# Patient Record
Sex: FEMALE | Race: BLACK OR AFRICAN AMERICAN | Employment: OTHER | URBAN - METROPOLITAN AREA
[De-identification: names, ages, dates, MRNs, and addresses within clinical notes are randomized per-mention and may not be internally consistent; named-entity substitution may affect disease eponyms.]

---

## 2017-07-31 ENCOUNTER — HOSPITAL ENCOUNTER (OUTPATIENT)
Dept: ULTRASOUND IMAGING | Age: 62
Discharge: HOME | End: 2017-07-31
Attending: INTERNAL MEDICINE
Payer: COMMERCIAL

## 2017-07-31 DIAGNOSIS — K21.9 GERD (GASTROESOPHAGEAL REFLUX DISEASE): ICD-10-CM

## 2017-07-31 DIAGNOSIS — R10.11 CHRONIC RUQ PAIN: ICD-10-CM

## 2017-07-31 DIAGNOSIS — R13.10 DYSPHAGIA: ICD-10-CM

## 2017-07-31 DIAGNOSIS — G89.29 CHRONIC RUQ PAIN: ICD-10-CM

## 2017-07-31 DIAGNOSIS — K90.41 GLUTEN INTOLERANCE: ICD-10-CM

## 2017-07-31 PROCEDURE — 76705 ECHO EXAM OF ABDOMEN: CPT

## 2018-11-26 ENCOUNTER — OFFICE VISIT (OUTPATIENT)
Dept: FAMILY MEDICINE CLINIC | Age: 63
End: 2018-11-26

## 2018-11-26 VITALS
TEMPERATURE: 97.9 F | OXYGEN SATURATION: 98 % | WEIGHT: 242.8 LBS | DIASTOLIC BLOOD PRESSURE: 73 MMHG | RESPIRATION RATE: 18 BRPM | HEIGHT: 68 IN | HEART RATE: 85 BPM | SYSTOLIC BLOOD PRESSURE: 144 MMHG | BODY MASS INDEX: 36.8 KG/M2

## 2018-11-26 DIAGNOSIS — E66.9 OBESITY (BMI 35.0-39.9 WITHOUT COMORBIDITY): ICD-10-CM

## 2018-11-26 DIAGNOSIS — E16.1 HYPERINSULINEMIA: ICD-10-CM

## 2018-11-26 DIAGNOSIS — Z13.6 SCREENING FOR ISCHEMIC HEART DISEASE: ICD-10-CM

## 2018-11-26 DIAGNOSIS — E78.2 MIXED HYPERLIPIDEMIA: Primary | ICD-10-CM

## 2018-11-26 PROBLEM — E66.01 SEVERE OBESITY (HCC): Status: ACTIVE | Noted: 2018-11-26

## 2018-11-26 NOTE — PROGRESS NOTES
1. Have you been to the ER, urgent care clinic since your last visit? Hospitalized since your last visit? No 
 
2. Have you seen or consulted any other health care providers outside of the [address] [place] [address] since your last visit? Include any pap smears or colon screening. [facility] Chief Complaint Patient presents with  Weight Management Body Weight: 242.8 Body Fat%: 44.1 Muscle Mass Weight: 36.9 Body Water Weight: 42.4 Basal Metabolic Rate: 9879 BMI: 36.92

## 2018-11-26 NOTE — PATIENT INSTRUCTIONS
If there is any GI bloating/gas try lactase enzyme. [facility] Dairy relief generic capsules are a good option

## 2018-11-26 NOTE — PROGRESS NOTES
[facility] Progress Note: Initial Physician Visit Shannan Bassett is a 59 y.o. female who is here for medical screening for entering the [facility] Weight Loss Program.  
 
CC:  Obesity Weight History I personally reviewed the Medical Screening Harvinder Laity completed by patient and scanned into media section of chart. It includes duration of their obesity, maximum weight, goal weight and weight gain time line (timing), all of which give the context of their obesity AND a Family History of their obesity. Is their Weight Loss Goal entered in to Comments? Weight loss History I personally reviewed the Medical Screening Harvinder Laity completed by the patient and scanned into media section of chart. It includes number of weight loss attempts, the weight loss program that patients was most successful using, and if they have any hx of anorectic medication use, including OTC supplements. This captures modifying factors. Significant Medical History Past Medical History:  
Diagnosis Date  Gluten intolerance  History of IBS  Hypertension  Hypothyroidism  Protein deficiency ([facility] [address]  Valvular heart disease I personally reviewed the Medical Screening Harvinder Laity completed by the patient and scanned into media section of chart. This allows me to assess associated symptoms that are significant in the assessment of the patient's obesity and the patient's Past Medical History. Outpatient Medications Marked as Taking for the 11/26/18 encounter (Office Visit) with Elaine Halsted, MD  
Medication Sig Dispense Refill  OTHER,NON-FORMULARY, Pancreatic Enzymes 500mg 1 capsule with meals  thyroid, Pork, (ARMOUR THYROID) 60 mg tablet Take 60 mg by mouth daily.  MULTIVITAMIN (MULTIPLE VITAMINS PO) Take  by mouth. occasional    
 CHOLECALCIFEROL, VITAMIN D3, (VITAMIN D3 PO) Take 5,000 Int'l Units by mouth daily. SLEEP: avg 4 hours on cpap Significant Psychosocial History Has a doctor every diagnosed with Binge Eating Disorder, Bulemia or Anorexia? : no  
 
Compliance Upcoming Travel? no 
 
Social History Social History Tobacco Use  Smoking status: Never Smoker  Smokeless tobacco: Never Used Substance Use Topics  Alcohol use: Yes Alcohol/week: 0.6 oz Types: 1 Cans of beer per week Comment: occassional 1-2 per year Exercise I personally reviewed the Medical Screening [place] completed by the patient and scanned into media section of chart. Review of Systems See HPI Objective Visit Vitals /73 Pulse 85 Temp 97.9 °F (36.6 °C) (Oral) Resp 18 Ht 5' 8\" (1.727 m) Wt 242 lb 12.8 oz (110.1 kg) SpO2 98% BMI 36.92 kg/m² Weight Metrics 11/26/2018 11/26/2018 8/17/2016 7/27/2012 Weight - 242 lb 12.8 oz 221 lb 9.6 oz 230 lb Neck Circ (inches) 14 - - - Waist Measure Inches 44 - - -  
BMI - 36.92 kg/m2 33.69 kg/m2 34.98 kg/m2 Labs: See HDL labs scanned into Media section Physical Exam 
 
Vital Signs Reviewed Weight Management Metrics Reviewed Appearance: well HEENT:  Scleral icterus?  no 
Neck:  Thyromegaly or nodules? no 
Mouth: Large tongue no Heart:  RRR Lungs:  clear Abdomen:   
 Hepatomegaly? no 
 Striae present? no 
Skin:  
 Acne?  no 
 Hirsutism? no 
 Skin tags? no 
 Acanthosis Nigricans?  no 
Ext:  Edema? no 
 
 
Assessment & Plan Encounter Diagnoses Name Primary?  Mixed hyperlipidemia Yes  Screening for ischemic heart disease  Obesity (BMI 35.0-39.9 without comorbidity)  Hyperinsulinemia 1. HDL labs reviewed w/ patient 2. EKG reviewed w/ patient: 
 Personally reviewed by me, NSR, No abnormalities, QTc = 408 sec (Upper limit is 440 for males & 460 for females) 3.  Medication changes include:  
 
Based on his history, labs and EKG, Valentin Scott is  a good candidate for the [facility] Program  
 
25 min of the 45 minutes face to face time with Daquan Carlson consisted of counseling & coordinating and/or discussing treatment plans in reference to her obesity The primary encounter diagnosis was Mixed hyperlipidemia. Diagnoses of Screening for ischemic heart disease, Obesity (BMI 35.0-39.9 without comorbidity), and Hyperinsulinemia were also pertinent to this visit.

## 2018-12-04 ENCOUNTER — CLINICAL SUPPORT (OUTPATIENT)
Dept: BARIATRICS/WEIGHT MGMT | Age: 63
End: 2018-12-04

## 2018-12-04 DIAGNOSIS — E66.01 SEVERE OBESITY (HCC): Primary | ICD-10-CM

## 2018-12-05 VITALS
BODY MASS INDEX: 35.54 KG/M2 | HEART RATE: 108 BPM | DIASTOLIC BLOOD PRESSURE: 83 MMHG | SYSTOLIC BLOOD PRESSURE: 153 MMHG | WEIGHT: 234.5 LBS | HEIGHT: 68 IN

## 2018-12-05 NOTE — PROGRESS NOTES
Progress Note: Weekly Education Class in the [facility] Weight Loss Program   Is there anything that you or the patient needs to let Dr Jose Rothman know about? no  Over the past week, have you experienced any side-effects? Yes, weakness, fatigue. Valentin Scott is a 59 y.o. female who is enrolled in [facility] Weight Loss Program    Visit Vitals  /83   Pulse (!) 108   Ht 5' 8\" (1.727 m)   Wt 234 lb 8 oz (106.4 kg)   BMI 35.66 kg/m²     Weight Metrics 12/5/2018 12/4/2018 11/26/2018 11/26/2018 8/17/2016 7/27/2012   Weight - 234 lb 8 oz - 242 lb 12.8 oz 221 lb 9.6 oz 230 lb   Neck Circ (inches) - - 14 - - -   Waist Measure Inches 43 - 44 - - -   BMI - 35.66 kg/m2 - 36.92 kg/m2 33.69 kg/m2 34.98 kg/m2         Have you received any other medical care this week? no  If yes, where and for what? Have you had any change in your medications since your last visit? no  If yes what? Did you have any problems adhering to the program last week? no  If yes, please explain:       Eating Habits Over Last Week:  Did you take in 64 oz of non-caloric fluids? yes     Did you consume your 4 meal replacements each day?  yes       Physical Activity Over the Past Week:    Aerobic exercise: 0 min  Resistance exercise: 0 workouts / week

## 2018-12-14 ENCOUNTER — CLINICAL SUPPORT (OUTPATIENT)
Dept: BARIATRICS/WEIGHT MGMT | Age: 63
End: 2018-12-14

## 2018-12-14 VITALS
HEART RATE: 89 BPM | WEIGHT: 230.2 LBS | BODY MASS INDEX: 34.89 KG/M2 | DIASTOLIC BLOOD PRESSURE: 76 MMHG | SYSTOLIC BLOOD PRESSURE: 144 MMHG | HEIGHT: 68 IN

## 2018-12-14 DIAGNOSIS — E16.1 HYPERINSULINEMIA: ICD-10-CM

## 2018-12-14 DIAGNOSIS — E66.01 SEVERE OBESITY (HCC): Primary | ICD-10-CM

## 2018-12-14 DIAGNOSIS — E78.2 MIXED HYPERLIPIDEMIA: ICD-10-CM

## 2018-12-14 DIAGNOSIS — E88.81 INSULIN RESISTANCE: ICD-10-CM

## 2018-12-14 NOTE — PROGRESS NOTES
Progress Note: Weekly Education Class in the [facility] Weight Loss Program   Is there anything that you or the patient needs to let Dr Jamie Vieira know about? no  Over the past week, have you experienced any side-effects? no    Gurmeet Harris is a 59 y.o. female who is enrolled in [facility] Weight Loss Program    Visit Vitals  /76   Pulse 89   Ht 5' 8\" (1.727 m)   Wt 230 lb 3.2 oz (104.4 kg)   BMI 35.00 kg/m²     Weight Metrics 12/14/2018 12/5/2018 12/4/2018 11/26/2018 11/26/2018 8/17/2016 7/27/2012   Weight 230 lb 3.2 oz - 234 lb 8 oz - 242 lb 12.8 oz 221 lb 9.6 oz 230 lb   Neck Circ (inches) - - - 14 - - -   Waist Measure Inches 42.5 43 - 44 - - -   BMI 35 kg/m2 - 35.66 kg/m2 - 36.92 kg/m2 33.69 kg/m2 34.98 kg/m2         Have you received any other medical care this week? no  If yes, where and for what? Have you had any change in your medications since your last visit? no  If yes what? Did you have any problems adhering to the program last week? yes  If yes, please explain:  Low carb foods       Eating Habits Over Last Week:  Did you take in 64 oz of non-caloric fluids?  yes     Did you consume your 4 meal replacements each day? yes  2-4 daily      Physical Activity Over the Past Week:    Aerobic exercise: 30 min  Resistance exercise: 0 workouts / week

## 2018-12-18 ENCOUNTER — CLINICAL SUPPORT (OUTPATIENT)
Dept: BARIATRICS/WEIGHT MGMT | Age: 63
End: 2018-12-18

## 2018-12-18 DIAGNOSIS — E66.01 SEVERE OBESITY (HCC): Primary | ICD-10-CM

## 2018-12-21 ENCOUNTER — CLINICAL SUPPORT (OUTPATIENT)
Dept: BARIATRICS/WEIGHT MGMT | Age: 63
End: 2018-12-21

## 2018-12-21 VITALS
WEIGHT: 226.5 LBS | DIASTOLIC BLOOD PRESSURE: 89 MMHG | HEIGHT: 68 IN | BODY MASS INDEX: 34.33 KG/M2 | SYSTOLIC BLOOD PRESSURE: 139 MMHG | HEART RATE: 85 BPM

## 2018-12-21 DIAGNOSIS — E16.1 HYPERINSULINEMIA: ICD-10-CM

## 2018-12-21 DIAGNOSIS — E88.81 INSULIN RESISTANCE: ICD-10-CM

## 2018-12-21 DIAGNOSIS — E66.01 SEVERE OBESITY (HCC): Primary | ICD-10-CM

## 2018-12-21 NOTE — PROGRESS NOTES
Progress Note: Weekly Education Class in the [facility] Weight Loss Program   Is there anything that you or the patient needs to let Dr Tea Meneses know about? no  Over the past week, have you experienced any side-effects? no    Rachana Malagon is a 59 y.o. female who is enrolled in [facility] Loss Program    Visit Vitals  /89   Pulse 85   Ht 5' 8\" (1.727 m)   Wt 226 lb 8 oz (102.7 kg)   BMI 34.44 kg/m²     Weight Metrics 12/21/2018 12/14/2018 12/5/2018 12/4/2018 11/26/2018 11/26/2018 8/17/2016   Weight 226 lb 8 oz 230 lb 3.2 oz - 234 lb 8 oz - 242 lb 12.8 oz 221 lb 9.6 oz   Neck Circ (inches) - - - - 14 - -   Waist Measure Inches 43 42.5 43 - 44 - -   BMI 34.44 kg/m2 35 kg/m2 - 35.66 kg/m2 - 36.92 kg/m2 33.69 kg/m2         Have you received any other medical care this week? no  If yes, where and for what? Have you had any change in your medications since your last visit? no  If yes what? Did you have any problems adhering to the program last week? no  If yes, please explain:       Eating Habits Over Last Week:  Did you take in 64 oz of non-caloric fluids? yes     Did you consume your 4 meal replacements each day?  yes       Physical Activity Over the Past Week:    Aerobic exercise: 60 min  Resistance exercise: 0 workouts / week

## 2018-12-24 ENCOUNTER — OFFICE VISIT (OUTPATIENT)
Dept: FAMILY MEDICINE CLINIC | Age: 63
End: 2018-12-24

## 2018-12-24 VITALS
WEIGHT: 231.4 LBS | TEMPERATURE: 98.3 F | SYSTOLIC BLOOD PRESSURE: 149 MMHG | DIASTOLIC BLOOD PRESSURE: 90 MMHG | OXYGEN SATURATION: 6 % | RESPIRATION RATE: 20 BRPM | HEIGHT: 68 IN | HEART RATE: 86 BPM | BODY MASS INDEX: 35.07 KG/M2

## 2018-12-24 DIAGNOSIS — E78.2 MIXED HYPERLIPIDEMIA: Primary | ICD-10-CM

## 2018-12-24 DIAGNOSIS — E88.81 INSULIN RESISTANCE: ICD-10-CM

## 2018-12-24 DIAGNOSIS — E16.1 HYPERINSULINEMIA: ICD-10-CM

## 2018-12-24 DIAGNOSIS — E66.9 OBESITY (BMI 35.0-39.9 WITHOUT COMORBIDITY): ICD-10-CM

## 2018-12-24 NOTE — PROGRESS NOTES
1. Have you been to the ER, urgent care clinic since your last visit? Hospitalized since your last visit? No    2. Have you seen or consulted any other health care providers outside of the [address] [place] [address] since your last visit? Include any pap smears or colon screening.  NO    Chief Complaint   Patient presents with    Weight Management     Body Weight: 231.4  Body Fat%: 43.9  Muscle Mass Weight: 35.1  Body Water Weight: 95.8  Basal Metabolic Rate: 3443  BMI: 35.18

## 2018-12-24 NOTE — PROGRESS NOTES
[facility] Progress Note:   F/up Physician Visit    CC: Weight Management      Daron Garcia is a 59 y.o. female who is here for her  f/up physician visit for the VLCD / LCD Program.    She had fried wings  And some tuna salad on Friday   Also wearing heavier clothes today  Her weight is up 5 lbs on the scale      Weight Metrics 12/24/2018 12/24/2018 12/21/2018 12/14/2018 12/5/2018 12/4/2018 11/26/2018   Weight - 231 lb 6.4 oz 226 lb 8 oz 230 lb 3.2 oz - 234 lb 8 oz -   Neck Circ (inches) 13.5 - - - - - 14   Waist Measure Inches 40 - 43 42.5 43 - 44   BMI - 35.18 kg/m2 34.44 kg/m2 35 kg/m2 - 35.66 kg/m2 -         Outpatient Medications Marked as Taking for the 12/24/18 encounter (Office Visit) with Salvador Menendez MD   Medication Sig Dispense Refill    OTHER,NON-FORMULARY, Pancreatic Enzymes 500mg 1 capsule with meals      thyroid, Pork, (ARMOUR THYROID) 60 mg tablet Take 60 mg by mouth daily.  MULTIVITAMIN (MULTIPLE VITAMINS PO) Take  by mouth. occasional           Participation   Did you attend clinic and class last week? no    Review of Systems  Since your last visit, have you experienced any complications? no  If yes, please list:       Are you taking an appetite suppressant? no  If so, is there any Chest Pain, Palpitations or Dizziness? BP Readings from Last 3 Encounters:   12/24/18 149/90   12/21/18 139/89   12/14/18 144/76     Night    SLEEP:3-4 hours broken up last     Have you received any other medical care this week? no  If yes, where and for what? Have you discontinued or changed any medicine or dose of your medicine since your last visit with Dr Julian Ashton? no  If yes, where and for what? Diet  How many ounces of calorie-free liquids did you consume each day? 64 oz    How many meal replacements did you take each day?  3    Did you have any problems adhering to the program?  no If yes, please explain:      Exercise  Aerobic exercise: 0 min  Resistance exercise: 0 workouts / week  Any discomfort?  no     If yes, where? Objective  Visit Vitals  /90   Pulse 86   Temp 98.3 °F (36.8 °C) (Oral)   Resp 20   Ht 5' 8\" (1.727 m)   Wt 231 lb 6.4 oz (105 kg)   SpO2 (!) 6%   BMI 35.18 kg/m²     No LMP recorded. Patient has had a hysterectomy. Physical Exam  Appearance: well,   Mental:A&O x 3, NAD  H:NC/AT,  EENT:   EOMI, PERRL, No scleral icterus  Neck: no bruit or JVD  Lung: clear, No W/R  ABD: soft, active, nontender  Ext:  no Edema  Neuro: nonfocal  Assessment / Plan    Encounter Diagnoses   Name Primary?  Mixed hyperlipidemia Yes    Obesity (BMI 35.0-39.9 without comorbidity)     Hyperinsulinemia     Insulin resistance      Diagnoses and all orders for this visit:    1. Mixed hyperlipidemia    2. Obesity (BMI 35.0-39.9 without comorbidity)    3. Hyperinsulinemia    4. Insulin resistance      1. Weight management improved   Progress was reviewed with patient    2. Labs    Latest results reviewed with patient   Lab slip given to pt for f/up HDL labs      Over 20 minutes of the 30 minutes face to face time with Abelardo Farias consisted of counseling & coordinating and/or discussing treatment plans in reference to her obesity The primary encounter diagnosis was Mixed hyperlipidemia. Diagnoses of Obesity (BMI 35.0-39.9 without comorbidity), Hyperinsulinemia, and Insulin resistance were also pertinent to this visit.

## 2018-12-28 NOTE — PROGRESS NOTES
Nurse note from patient's weekly VLCD / LCD / Maintenance class was reviewed. Pertinent medical concerns were:   none     BP Readings from Last 3 Encounters:   12/24/18 149/90   12/21/18 139/89   12/14/18 144/76       Weight Metrics 12/24/2018 12/24/2018 12/21/2018 12/14/2018 12/5/2018 12/4/2018 11/26/2018   Weight - 231 lb 6.4 oz 226 lb 8 oz 230 lb 3.2 oz - 234 lb 8 oz -   Neck Circ (inches) 13.5 - - - - - 14   Waist Measure Inches 40 - 43 42.5 43 - 44   BMI - 35.18 kg/m2 34.44 kg/m2 35 kg/m2 - 35.66 kg/m2 -       Current Outpatient Medications   Medication Sig Dispense Refill    lisinopril (PRINIVIL, ZESTRIL) 10 mg tablet Take  by mouth daily.  co-enzyme Q-10 (CO Q-10) 100 mg capsule Take 100 mg by mouth daily.  aspirin delayed-release 81 mg tablet Take 81 mg by mouth two (2) times a week.  OTHER,NON-FORMULARY, Pancreatic Enzymes 500mg 1 capsule with meals      TURMERIC, BULK, Take 500 mg by mouth. occasional      OTHER,NON-FORMULARY, Gallbladder formula 1 capsule 2-3 times a day with meals      LACTOBACILLUS ACIDOPHILUS (PROBIOTIC PO) Take 1 Cap by mouth two (2) times a day.  COCONUT OIL PO Take  by mouth. occasional      thyroid, Pork, (ARMOUR THYROID) 60 mg tablet Take 60 mg by mouth daily.  MULTIVITAMIN (MULTIPLE VITAMINS PO) Take  by mouth. occasional      CHOLECALCIFEROL, VITAMIN D3, (VITAMIN D3 PO) Take 5,000 Int'l Units by mouth daily.

## 2019-01-11 ENCOUNTER — CLINICAL SUPPORT (OUTPATIENT)
Dept: BARIATRICS/WEIGHT MGMT | Age: 64
End: 2019-01-11

## 2019-01-11 VITALS
HEART RATE: 102 BPM | BODY MASS INDEX: 32.99 KG/M2 | HEIGHT: 68 IN | SYSTOLIC BLOOD PRESSURE: 145 MMHG | WEIGHT: 217.7 LBS | DIASTOLIC BLOOD PRESSURE: 89 MMHG

## 2019-01-11 DIAGNOSIS — E66.01 SEVERE OBESITY (HCC): Primary | ICD-10-CM

## 2019-01-11 DIAGNOSIS — E88.81 INSULIN RESISTANCE: ICD-10-CM

## 2019-01-11 DIAGNOSIS — E78.2 MIXED HYPERLIPIDEMIA: ICD-10-CM

## 2019-01-11 NOTE — PROGRESS NOTES
Progress Note: Weekly Education Class in the [facility] Weight Loss Program   Is there anything that you or the patient needs to let Dr Fitz Camargo know about? no  Over the past week, have you experienced any side-effects? no    Vinod Arriola is a 59 y.o. female who is enrolled in [id number] [facility] Weight Loss Program    Visit Vitals  /89   Pulse (!) 102   Ht 5' 8\" (1.727 m)   Wt 217 lb 11.2 oz (98.7 kg)   BMI 33.10 kg/m²     Weight Metrics 1/11/2019 12/24/2018 12/24/2018 12/21/2018 12/14/2018 12/5/2018 12/4/2018   Weight 217 lb 11.2 oz - 231 lb 6.4 oz 226 lb 8 oz 230 lb 3.2 oz - 234 lb 8 oz   Neck Circ (inches) - 13.5 - - - - -   Waist Measure Inches 39.5 40 - 43 42.5 43 -   BMI 33.1 kg/m2 - 35.18 kg/m2 34.44 kg/m2 35 kg/m2 - 35.66 kg/m2         Have you received any other medical care this week? no  If yes, where and for what? Have you had any change in your medications since your last visit? no  If yes what? Did you have any problems adhering to the program last week? no  If yes, please explain:       Eating Habits Over Last Week:  Did you take in 64 oz of non-caloric fluids? yes     Did you consume your 4 meal replacements each day?  yes       Physical Activity Over the Past Week:    Aerobic exercise: 0 min  Resistance exercise: 0 workouts / week

## 2019-01-17 NOTE — PROGRESS NOTES
Nurse note from patient's weekly VLCD / LCD / Maintenance class was reviewed. Pertinent medical concerns were:   none     BP Readings from Last 3 Encounters:   01/11/19 145/89   12/24/18 149/90   12/21/18 139/89       Weight Metrics 1/11/2019 12/24/2018 12/24/2018 12/21/2018 12/14/2018 12/5/2018 12/4/2018   Weight 217 lb 11.2 oz - 231 lb 6.4 oz 226 lb 8 oz 230 lb 3.2 oz - 234 lb 8 oz   Neck Circ (inches) - 13.5 - - - - -   Waist Measure Inches 39.5 40 - 43 42.5 43 -   BMI 33.1 kg/m2 - 35.18 kg/m2 34.44 kg/m2 35 kg/m2 - 35.66 kg/m2       Current Outpatient Medications   Medication Sig Dispense Refill    lisinopril (PRINIVIL, ZESTRIL) 10 mg tablet Take  by mouth daily.  co-enzyme Q-10 (CO Q-10) 100 mg capsule Take 100 mg by mouth daily.  aspirin delayed-release 81 mg tablet Take 81 mg by mouth two (2) times a week.  OTHER,NON-FORMULARY, Pancreatic Enzymes 500mg 1 capsule with meals      TURMERIC, BULK, Take 500 mg by mouth. occasional      OTHER,NON-FORMULARY, Gallbladder formula 1 capsule 2-3 times a day with meals      LACTOBACILLUS ACIDOPHILUS (PROBIOTIC PO) Take 1 Cap by mouth two (2) times a day.  COCONUT OIL PO Take  by mouth. occasional      thyroid, Pork, (ARMOUR THYROID) 60 mg tablet Take 60 mg by mouth daily.  MULTIVITAMIN (MULTIPLE VITAMINS PO) Take  by mouth. occasional      CHOLECALCIFEROL, VITAMIN D3, (VITAMIN D3 PO) Take 5,000 Int'l Units by mouth daily.

## 2019-01-18 ENCOUNTER — CLINICAL SUPPORT (OUTPATIENT)
Dept: BARIATRICS/WEIGHT MGMT | Age: 64
End: 2019-01-18

## 2019-01-18 VITALS
DIASTOLIC BLOOD PRESSURE: 71 MMHG | HEART RATE: 89 BPM | BODY MASS INDEX: 33.13 KG/M2 | SYSTOLIC BLOOD PRESSURE: 153 MMHG | WEIGHT: 218.6 LBS | HEIGHT: 68 IN

## 2019-01-18 DIAGNOSIS — E78.2 MIXED HYPERLIPIDEMIA: Primary | ICD-10-CM

## 2019-01-18 DIAGNOSIS — E16.1 HYPERINSULINEMIA: ICD-10-CM

## 2019-01-18 DIAGNOSIS — E66.01 SEVERE OBESITY (HCC): ICD-10-CM

## 2019-01-18 NOTE — PROGRESS NOTES
Progress Note: Weekly Education Class in the [facility] Weight Loss Program   Is there anything that you or the patient needs to let Dr Josse Myles know about? no  Over the past week, have you experienced any side-effects? no    Apple Calloway is a 59 y.o. female who is enrolled in [facility] Weight Loss Program    Visit Vitals  /71   Pulse 89   Ht 5' 8\" (1.727 m)   Wt 218 lb 9.6 oz (99.2 kg)   BMI 33.24 kg/m²     Weight Metrics 1/18/2019 1/11/2019 12/24/2018 12/24/2018 12/21/2018 12/14/2018 12/5/2018   Weight 218 lb 9.6 oz 217 lb 11.2 oz - 231 lb 6.4 oz 226 lb 8 oz 230 lb 3.2 oz -   Neck Circ (inches) - - 13.5 - - - -   Waist Measure Inches - 39.5 40 - 43 42.5 43   BMI 33.24 kg/m2 33.1 kg/m2 - 35.18 kg/m2 34.44 kg/m2 35 kg/m2 -         Have you received any other medical care this week? no  If yes, where and for what? Have you had any change in your medications since your last visit? no  If yes what? Did you have any problems adhering to the program last week? no  If yes, please explain:       Eating Habits Over Last Week:  Did you take in 64 oz of non-caloric fluids? yes     Did you consume your 4 meal replacements each day?  yes       Physical Activity Over the Past Week:    Aerobic exercise: 0 min  Resistance exercise: 0 workouts / week

## 2019-01-21 ENCOUNTER — OFFICE VISIT (OUTPATIENT)
Dept: FAMILY MEDICINE CLINIC | Age: 64
End: 2019-01-21

## 2019-01-21 VITALS
SYSTOLIC BLOOD PRESSURE: 122 MMHG | RESPIRATION RATE: 19 BRPM | TEMPERATURE: 98.5 F | OXYGEN SATURATION: 96 % | HEART RATE: 104 BPM | WEIGHT: 215.8 LBS | DIASTOLIC BLOOD PRESSURE: 79 MMHG | HEIGHT: 68 IN | BODY MASS INDEX: 32.71 KG/M2

## 2019-01-21 DIAGNOSIS — E88.81 INSULIN RESISTANCE: ICD-10-CM

## 2019-01-21 DIAGNOSIS — E66.9 OBESITY (BMI 35.0-39.9 WITHOUT COMORBIDITY): ICD-10-CM

## 2019-01-21 DIAGNOSIS — E16.1 HYPERINSULINEMIA: ICD-10-CM

## 2019-01-21 DIAGNOSIS — E78.2 MIXED HYPERLIPIDEMIA: ICD-10-CM

## 2019-01-21 DIAGNOSIS — K59.01 SLOW TRANSIT CONSTIPATION: Primary | ICD-10-CM

## 2019-01-21 RX ORDER — DOCUSATE SODIUM 100 MG/1
100 CAPSULE, LIQUID FILLED ORAL 2 TIMES DAILY
Qty: 60 CAP | Refills: 2 | Status: SHIPPED | OUTPATIENT
Start: 2019-01-21 | End: 2019-04-21

## 2019-01-21 NOTE — PROGRESS NOTES
Nurse note from patient's weekly VLCD / LCD / Maintenance class was reviewed. Pertinent medical concerns were:   none     BP Readings from Last 3 Encounters:   01/18/19 153/71   01/11/19 145/89   12/24/18 149/90       Weight Metrics 1/18/2019 1/11/2019 12/24/2018 12/24/2018 12/21/2018 12/14/2018 12/5/2018   Weight 218 lb 9.6 oz 217 lb 11.2 oz - 231 lb 6.4 oz 226 lb 8 oz 230 lb 3.2 oz -   Neck Circ (inches) - - 13.5 - - - -   Waist Measure Inches - 39.5 40 - 43 42.5 43   BMI 33.24 kg/m2 33.1 kg/m2 - 35.18 kg/m2 34.44 kg/m2 35 kg/m2 -       Current Outpatient Medications   Medication Sig Dispense Refill    lisinopril (PRINIVIL, ZESTRIL) 10 mg tablet Take  by mouth daily.  co-enzyme Q-10 (CO Q-10) 100 mg capsule Take 100 mg by mouth daily.  aspirin delayed-release 81 mg tablet Take 81 mg by mouth two (2) times a week.  OTHER,NON-FORMULARY, Pancreatic Enzymes 500mg 1 capsule with meals      TURMERIC, BULK, Take 500 mg by mouth. occasional      OTHER,NON-FORMULARY, Gallbladder formula 1 capsule 2-3 times a day with meals      LACTOBACILLUS ACIDOPHILUS (PROBIOTIC PO) Take 1 Cap by mouth two (2) times a day.  COCONUT OIL PO Take  by mouth. occasional      thyroid, Pork, (ARMOUR THYROID) 60 mg tablet Take 60 mg by mouth daily.  MULTIVITAMIN (MULTIPLE VITAMINS PO) Take  by mouth. occasional      CHOLECALCIFEROL, VITAMIN D3, (VITAMIN D3 PO) Take 5,000 Int'l Units by mouth daily.

## 2019-01-21 NOTE — PROGRESS NOTES
[facility] Progress Note:  
F/up Physician Visit CC: Weight Management Arvella Homans is a 59 y.o. female who is here for her  f/up physician visit for the VLCD / LCD Program. 
She is c/o constipation. She is drinking water She walks 2-3 times a day She has a bm about every other day She is drinking 2 fiber drinks a day Weight Metrics 1/21/2019 1/21/2019 1/18/2019 1/11/2019 12/24/2018 12/24/2018 12/21/2018 Weight - 215 lb 12.8 oz 218 lb 9.6 oz 217 lb 11.2 oz - 231 lb 6.4 oz 226 lb 8 oz Neck Circ (inches) - - - - 13.5 - - Waist Measure Inches 40.5 - - 39.5 40 - 43 BMI - 32.81 kg/m2 33.24 kg/m2 33.1 kg/m2 - 35.18 kg/m2 34.44 kg/m2  
242 
215 
-28 lbs Outpatient Medications Marked as Taking for the 1/21/19 encounter (Office Visit) with Lisa Espinoza MD  
Medication Sig Dispense Refill  docusate sodium (COLACE) 100 mg capsule Take 1 Cap by mouth two (2) times a day for 90 days. 60 Cap 2  
 OTHER,NON-FORMULARY, Pancreatic Enzymes 500mg 1 capsule with meals  thyroid, Pork, (ARMOUR THYROID) 60 mg tablet Take 60 mg by mouth daily.  MULTIVITAMIN (MULTIPLE VITAMINS PO) Take  by mouth. occasional    
 
 
Participation Did you attend clinic and class last week? yes Review of Systems Since your last visit, have you experienced any complications? no If yes, please list:  
 
 
Are you taking an appetite suppressant? no 
If so, is there any Chest Pain, Palpitations or Dizziness? BP Readings from Last 3 Encounters:  
01/21/19 122/79  
01/18/19 153/71  
01/11/19 145/89 SLEEP: 
 
Have you received any other medical care this week? no  If yes, where and for what? Have you discontinued or changed any medicine or dose of your medicine since your last visit with Dr Yaritza Hope? no  If yes, where and for what? Diet How many ounces of calorie-free liquids did you consume each day? 64 oz How many meal replacements did you take each day? 4 
 
 Did you have any problems adhering to the program?  no If yes, please explain: 
 
 
Exercise Aerobic exercise: 20 min Resistance exercise: 2 workouts / week Any discomfort?  no If yes, where? Objective Visit Vitals /79 Pulse (!) 104 Temp 98.5 °F (36.9 °C) (Oral) Resp 19 Ht 5' 8\" (1.727 m) Wt 215 lb 12.8 oz (97.9 kg) SpO2 96% BMI 32.81 kg/m² No LMP recorded. Patient has had a hysterectomy. Physical Exam 
Appearance: well, Mental:A&O x 3, NAD H:NC/AT, 
EENT:   EOMI, PERRL, No scleral icterus Neck: no bruit or JVD Lung: clear, No W/R 
ABD: soft, active, nontender Ext:  no Edema Neuro: nonfocal 
Assessment / Plan Encounter Diagnoses Name Primary?  Slow transit constipation Yes  Mixed hyperlipidemia  Obesity (BMI 35.0-39.9 without comorbidity)  Hyperinsulinemia  Insulin resistance Diagnoses and all orders for this visit: 1. Slow transit constipation 
-     docusate sodium (COLACE) 100 mg capsule; Take 1 Cap by mouth two (2) times a day for 90 days. 2. Mixed hyperlipidemia Cont to monitor andtreat 3. Obesity (BMI 35.0-39.9 without comorbidity) Starting the vlcd today 4. Hyperinsulinemia Monitor and treat 5. Insulin resistance 1. Weight management needs improvement Progress was reviewed with patient 2. Labs Latest results reviewed with patient Lab slip given to pt for f/up HDL labs Over 20minutes of the 30 minutes face to face time with [facility] consisted of counseling & coordinating and/or discussing treatment plans in reference to her obesity The primary encounter diagnosis was Slow transit constipation. Diagnoses of Mixed hyperlipidemia, Obesity (BMI 35.0-39.9 without comorbidity), Hyperinsulinemia, and Insulin resistance were also pertinent to this visit.

## 2019-01-21 NOTE — PROGRESS NOTES
1. Have you been to the ER, urgent care clinic since your last visit? Hospitalized since your last visit? No 
 
2. Have you seen or consulted any other health care providers outside of the [address] [place] [address] since your last visit? Include any pap smears or colon screening. No  
 
Chief Complaint Patient presents with  Weight Management Body Weight: 215.8 Body Fat%: 42.0 Muscle Mass Weight: 32.7 Body Water Weight: 43.4 Basal Metabolic Rate: 2978 BMI: 32.81

## 2019-01-29 ENCOUNTER — CLINICAL SUPPORT (OUTPATIENT)
Dept: BARIATRICS/WEIGHT MGMT | Age: 64
End: 2019-01-29

## 2019-01-29 DIAGNOSIS — E78.2 MIXED HYPERLIPIDEMIA: Primary | ICD-10-CM

## 2019-01-29 DIAGNOSIS — E66.01 SEVERE OBESITY (HCC): ICD-10-CM

## 2019-02-01 ENCOUNTER — CLINICAL SUPPORT (OUTPATIENT)
Dept: BARIATRICS/WEIGHT MGMT | Age: 64
End: 2019-02-01

## 2019-02-01 VITALS
WEIGHT: 214.5 LBS | HEART RATE: 83 BPM | SYSTOLIC BLOOD PRESSURE: 138 MMHG | HEIGHT: 68 IN | DIASTOLIC BLOOD PRESSURE: 67 MMHG | BODY MASS INDEX: 32.51 KG/M2

## 2019-02-01 DIAGNOSIS — E78.2 MIXED HYPERLIPIDEMIA: Primary | ICD-10-CM

## 2019-02-01 DIAGNOSIS — E66.01 SEVERE OBESITY (HCC): ICD-10-CM

## 2019-02-01 DIAGNOSIS — E16.1 HYPERINSULINEMIA: ICD-10-CM

## 2019-02-01 NOTE — PROGRESS NOTES
Progress Note: Weekly Education Class in the [facility] Weight Loss Program   Is there anything that you or the patient needs to let Dr Chrissie Colby know about? no  Over the past week, have you experienced any side-effects? no    Stephane Trujillo is a 59 y.o. female who is enrolled in [facility]    Visit Vitals  /67   Pulse 83   Ht 5' 8\" (1.727 m)   Wt 214 lb 8 oz (97.3 kg)   BMI 32.61 kg/m²     Weight Metrics 2/1/2019 1/21/2019 1/21/2019 1/18/2019 1/11/2019 12/24/2018 12/24/2018   Weight 214 lb 8 oz - 215 lb 12.8 oz 218 lb 9.6 oz 217 lb 11.2 oz - 231 lb 6.4 oz   Neck Circ (inches) - - - - - 13.5 -   Waist Measure Inches 39.5 40.5 - - 39.5 40 -   BMI 32.61 kg/m2 - 32.81 kg/m2 33.24 kg/m2 33.1 kg/m2 - 35.18 kg/m2         Have you received any other medical care this week? no  If yes, where and for what? Have you had any change in your medications since your last visit? no  If yes what? Did you have any problems adhering to the program last week? no  If yes, please explain:       Eating Habits Over Last Week:  Did you take in 64 oz of non-caloric fluids? yes     Did you consume your 4 meal replacements each day?  Yes, 3-4 daily with peanuts and broccoli      Physical Activity Over the Past Week:    Aerobic exercise: 60 min  Resistance exercise: 0 workouts / week

## 2019-02-05 NOTE — PROGRESS NOTES
Nurse note from patient's weekly VLCD / LCD / Maintenance class was reviewed. Pertinent medical concerns were:  none     BP Readings from Last 3 Encounters:   02/01/19 138/67   01/21/19 122/79   01/18/19 153/71       Weight Metrics 2/1/2019 1/21/2019 1/21/2019 1/18/2019 1/11/2019 12/24/2018 12/24/2018   Weight 214 lb 8 oz - 215 lb 12.8 oz 218 lb 9.6 oz 217 lb 11.2 oz - 231 lb 6.4 oz   Neck Circ (inches) - - - - - 13.5 -   Waist Measure Inches 39.5 40.5 - - 39.5 40 -   BMI 32.61 kg/m2 - 32.81 kg/m2 33.24 kg/m2 33.1 kg/m2 - 35.18 kg/m2       Current Outpatient Medications   Medication Sig Dispense Refill    docusate sodium (COLACE) 100 mg capsule Take 1 Cap by mouth two (2) times a day for 90 days. 60 Cap 2    lisinopril (PRINIVIL, ZESTRIL) 10 mg tablet Take  by mouth daily.  co-enzyme Q-10 (CO Q-10) 100 mg capsule Take 100 mg by mouth daily.  aspirin delayed-release 81 mg tablet Take 81 mg by mouth two (2) times a week.  OTHER,NON-FORMULARY, Pancreatic Enzymes 500mg 1 capsule with meals      TURMERIC, BULK, Take 500 mg by mouth. occasional      OTHER,NON-FORMULARY, Gallbladder formula 1 capsule 2-3 times a day with meals      LACTOBACILLUS ACIDOPHILUS (PROBIOTIC PO) Take 1 Cap by mouth two (2) times a day.  COCONUT OIL PO Take  by mouth. occasional      thyroid, Pork, (ARMOUR THYROID) 60 mg tablet Take 60 mg by mouth daily.  MULTIVITAMIN (MULTIPLE VITAMINS PO) Take  by mouth. occasional      CHOLECALCIFEROL, VITAMIN D3, (VITAMIN D3 PO) Take 5,000 Int'l Units by mouth daily.

## 2019-02-08 ENCOUNTER — CLINICAL SUPPORT (OUTPATIENT)
Dept: BARIATRICS/WEIGHT MGMT | Age: 64
End: 2019-02-08

## 2019-02-08 VITALS
HEART RATE: 78 BPM | DIASTOLIC BLOOD PRESSURE: 65 MMHG | HEIGHT: 68 IN | SYSTOLIC BLOOD PRESSURE: 148 MMHG | BODY MASS INDEX: 32.46 KG/M2 | WEIGHT: 214.2 LBS

## 2019-02-08 DIAGNOSIS — E66.01 SEVERE OBESITY (HCC): ICD-10-CM

## 2019-02-08 DIAGNOSIS — E88.81 INSULIN RESISTANCE: ICD-10-CM

## 2019-02-08 DIAGNOSIS — E78.2 MIXED HYPERLIPIDEMIA: Primary | ICD-10-CM

## 2019-02-08 DIAGNOSIS — E16.1 HYPERINSULINEMIA: ICD-10-CM

## 2019-02-08 NOTE — PROGRESS NOTES
Progress Note: Weekly Education Class in the [facility] Weight Loss Program   Is there anything that you or the patient needs to let Dr Vinnie Gonzales know about? no  Over the past week, have you experienced any side-effects? no    Gilmar Bazan is a 59 y.o. female who is enrolled in [facility] Loss Program    Visit Vitals  /65   Pulse 78   Ht 5' 8\" (1.727 m)   Wt 214 lb 3.2 oz (97.2 kg)   BMI 32.57 kg/m²     Weight Metrics 2/8/2019 2/1/2019 1/21/2019 1/21/2019 1/18/2019 1/11/2019 12/24/2018   Weight 214 lb 3.2 oz 214 lb 8 oz - 215 lb 12.8 oz 218 lb 9.6 oz 217 lb 11.2 oz -   Neck Circ (inches) - - - - - - 13.5   Waist Measure Inches - 39.5 40.5 - - 39.5 40   BMI 32.57 kg/m2 32.61 kg/m2 - 32.81 kg/m2 33.24 kg/m2 33.1 kg/m2 -         Have you received any other medical care this week? no  If yes, where and for what? Have you had any change in your medications since your last visit? no  If yes what? Did you have any problems adhering to the program last week? no  If yes, please explain: feeling hungry. Eating Habits Over Last Week:  Did you take in 64 oz of non-caloric fluids? yes    Did you consume your 4 meal replacements each day?  yes       Physical Activity Over the Past Week:    Aerobic exercise: 80 min  Resistance exercise: 1 workouts / week

## 2019-02-15 ENCOUNTER — TELEPHONE (OUTPATIENT)
Dept: FAMILY MEDICINE CLINIC | Age: 64
End: 2019-02-15

## 2019-02-15 ENCOUNTER — CLINICAL SUPPORT (OUTPATIENT)
Dept: BARIATRICS/WEIGHT MGMT | Age: 64
End: 2019-02-15

## 2019-02-15 VITALS
HEIGHT: 68 IN | HEART RATE: 77 BPM | SYSTOLIC BLOOD PRESSURE: 123 MMHG | DIASTOLIC BLOOD PRESSURE: 76 MMHG | WEIGHT: 210.3 LBS | BODY MASS INDEX: 31.87 KG/M2

## 2019-02-15 DIAGNOSIS — E78.2 MIXED HYPERLIPIDEMIA: Primary | ICD-10-CM

## 2019-02-15 DIAGNOSIS — E66.01 SEVERE OBESITY (HCC): ICD-10-CM

## 2019-02-15 DIAGNOSIS — E16.1 HYPERINSULINEMIA: ICD-10-CM

## 2019-02-15 DIAGNOSIS — E88.81 INSULIN RESISTANCE: ICD-10-CM

## 2019-02-15 NOTE — TELEPHONE ENCOUNTER
Spoke with patient and advised that labs are in the system. Patient verbalized understanding and had no questions at this time.

## 2019-02-15 NOTE — PROGRESS NOTES
Progress Note: Weekly Education Class in the [facility] Weight Loss Program   Is there anything that you or the patient needs to let Dr Yash Mackey know about? no  Over the past week, have you experienced any side-effects? no    Irma Underwood is a 59 y.o. female who is enrolled in [facility] Loss Program    Visit Vitals  /76   Pulse 77   Ht 5' 8\" (1.727 m)   Wt 210 lb 4.8 oz (95.4 kg)   BMI 31.98 kg/m²     Weight Metrics 2/15/2019 2/8/2019 2/1/2019 1/21/2019 1/21/2019 1/18/2019 1/11/2019   Weight 210 lb 4.8 oz 214 lb 3.2 oz 214 lb 8 oz - 215 lb 12.8 oz 218 lb 9.6 oz 217 lb 11.2 oz   Neck Circ (inches) - - - - - - -   Waist Measure Inches - - 39.5 40.5 - - 39.5   BMI 31.98 kg/m2 32.57 kg/m2 32.61 kg/m2 - 32.81 kg/m2 33.24 kg/m2 33.1 kg/m2         Have you received any other medical care this week? no  If yes, where and for what? Have you had any change in your medications since your last visit? no  If yes what? Did you have any problems adhering to the program last week? yes  If yes, please explain:       Eating Habits Over Last Week:  Did you take in 64 oz of non-caloric fluids? yes     Did you consume your 4 meal replacements each day?  yes       Physical Activity Over the Past Week:    Aerobic exercise: 50 min  Resistance exercise: 0 workouts / week

## 2019-02-18 ENCOUNTER — OFFICE VISIT (OUTPATIENT)
Dept: FAMILY MEDICINE CLINIC | Age: 64
End: 2019-02-18

## 2019-02-18 VITALS
SYSTOLIC BLOOD PRESSURE: 119 MMHG | HEART RATE: 73 BPM | BODY MASS INDEX: 31.83 KG/M2 | RESPIRATION RATE: 19 BRPM | HEIGHT: 68 IN | OXYGEN SATURATION: 98 % | WEIGHT: 210 LBS | DIASTOLIC BLOOD PRESSURE: 74 MMHG | TEMPERATURE: 97.8 F

## 2019-02-18 DIAGNOSIS — E16.1 HYPERINSULINEMIA: ICD-10-CM

## 2019-02-18 DIAGNOSIS — E78.2 MIXED HYPERLIPIDEMIA: Primary | ICD-10-CM

## 2019-02-18 DIAGNOSIS — E88.81 INSULIN RESISTANCE: ICD-10-CM

## 2019-02-18 DIAGNOSIS — E66.9 OBESITY (BMI 35.0-39.9 WITHOUT COMORBIDITY): ICD-10-CM

## 2019-02-18 NOTE — PROGRESS NOTES
1. Have you been to the ER, urgent care clinic since your last visit? Hospitalized since your last visit? No 
 
2. Have you seen or consulted any other health care providers outside of the [address] [place] [address] since your last visit? Include any pap smears or colon screening. No  
 
Chief Complaint Patient presents with  Weight Management Body Weight: 210.0 Body Fat%: 41.2 Muscle Mass Weight: 31.9 Body Water Weight: 43.8 Basal Metabolic Rate: 9958 BMI: 31.93

## 2019-02-18 NOTE — PROGRESS NOTES
[facility] Progress Note:  
F/up Physician Visit CC: Weight Management Wily Chavarria is a 59 y.o. female who is here for her  f/up physician visit for the VLCD / LCD Program. 
 
Weight Metrics 2/18/2019 2/18/2019 2/15/2019 2/8/2019 2/1/2019 1/21/2019 1/21/2019 Weight - 210 lb 210 lb 4.8 oz 214 lb 3.2 oz 214 lb 8 oz - 215 lb 12.8 oz Neck Circ (inches) 14 - - - - - - Waist Measure Inches 40 - - - 39.5 40.5 - BMI - 31.93 kg/m2 31.98 kg/m2 32.57 kg/m2 32.61 kg/m2 - 32.81 kg/m2  
-32 lbs Goal: BMI 27 Outpatient Medications Marked as Taking for the 2/18/19 encounter (Office Visit) with Agustin Monaco MD  
Medication Sig Dispense Refill  docusate sodium (COLACE) 100 mg capsule Take 1 Cap by mouth two (2) times a day for 90 days. 60 Cap 2  
 OTHER,NON-FORMULARY, Pancreatic Enzymes 500mg 1 capsule with meals  thyroid, Pork, (ARMOUR THYROID) 60 mg tablet Take 60 mg by mouth daily.  MULTIVITAMIN (MULTIPLE VITAMINS PO) Take  by mouth. occasional    
 
 
Participation Did you attend clinic and class last week? yes Review of Systems Since your last visit, have you experienced any complications? no If yes, please list:  
 
 
Are you taking an appetite suppressant? no 
If so, is there any Chest Pain, Palpitations or Dizziness? BP Readings from Last 3 Encounters:  
02/18/19 119/74  
02/15/19 123/76  
02/08/19 148/65 SLEEP: 
 
Have you received any other medical care this week? no  If yes, where and for what? Have you discontinued or changed any medicine or dose of your medicine since your last visit with Dr Symone Walls? no  If yes, where and for what? Diet How many ounces of calorie-free liquids did you consume each day? 64 oz How many meal replacements did you take each day? 4 Did you have any problems adhering to the program?  yes If yes, please explain:  tempts her Exercise Aerobic exercise:0  min Resistance exercise: 0 workouts / week Any discomfort?  no If yes, where? Objective Visit Vitals /74 Pulse 73 Temp 97.8 °F (36.6 °C) (Oral) Resp 19 Ht 5' 8\" (1.727 m) Wt 210 lb (95.3 kg) SpO2 98% BMI 31.93 kg/m² No LMP recorded. Patient has had a hysterectomy. Physical Exam 
Appearance: well, Mental:A&O x 3, NAD H:NC/AT, 
EENT:   EOMI, PERRL, No scleral icterus Neck: no bruit or JVD Lung: clear, No W/R 
ABD: soft, active, nontender Ext:  no Edema Neuro: nonfocal 
Assessment / Plan Encounter Diagnoses Name Primary?  Mixed hyperlipidemia Yes  Obesity (BMI 35.0-39.9 without comorbidity)  Hyperinsulinemia  Insulin resistance Diagnoses and all orders for this visit: 
 
1. Mixed hyperlipidemia Improved, cont to monitor 2. Obesity (BMI 35.0-39.9 without comorbidity) Improved also, recheck in 1 week 3. Hyperinsulinemia Went up slightly She has beeen eating sweets 4. Insulin resistance Still resistant. But the a1c is 5.6 1. Weight management improved Progress was reviewed with patient 2. Labs Latest results reviewed with patient Lab slip given to pt for f/up HDL labs Over 20 minutes of the 30 minutes face to face time with Veronika Milton consisted of counseling & coordinating and/or discussing treatment plans in reference to her obesity The primary encounter diagnosis was Mixed hyperlipidemia. Diagnoses of Obesity (BMI 35.0-39.9 without comorbidity), Hyperinsulinemia, and Insulin resistance were also pertinent to this visit.

## 2019-02-21 NOTE — PROGRESS NOTES
Nurse note from patient's weekly VLCD / LCD / Maintenance class was reviewed. Pertinent medical concerns were:   none     BP Readings from Last 3 Encounters:   02/18/19 119/74   02/15/19 123/76   02/08/19 148/65       Weight Metrics 2/18/2019 2/18/2019 2/15/2019 2/8/2019 2/1/2019 1/21/2019 1/21/2019   Weight - 210 lb 210 lb 4.8 oz 214 lb 3.2 oz 214 lb 8 oz - 215 lb 12.8 oz   Neck Circ (inches) 14 - - - - - -   Waist Measure Inches 40 - - - 39.5 40.5 -   BMI - 31.93 kg/m2 31.98 kg/m2 32.57 kg/m2 32.61 kg/m2 - 32.81 kg/m2       Current Outpatient Medications   Medication Sig Dispense Refill    docusate sodium (COLACE) 100 mg capsule Take 1 Cap by mouth two (2) times a day for 90 days. 60 Cap 2    lisinopril (PRINIVIL, ZESTRIL) 10 mg tablet Take  by mouth daily.  co-enzyme Q-10 (CO Q-10) 100 mg capsule Take 100 mg by mouth daily.  aspirin delayed-release 81 mg tablet Take 81 mg by mouth two (2) times a week.  OTHER,NON-FORMULARY, Pancreatic Enzymes 500mg 1 capsule with meals      TURMERIC, BULK, Take 500 mg by mouth. occasional      OTHER,NON-FORMULARY, Gallbladder formula 1 capsule 2-3 times a day with meals      LACTOBACILLUS ACIDOPHILUS (PROBIOTIC PO) Take 1 Cap by mouth two (2) times a day.  COCONUT OIL PO Take  by mouth. occasional      thyroid, Pork, (ARMOUR THYROID) 60 mg tablet Take 60 mg by mouth daily.  MULTIVITAMIN (MULTIPLE VITAMINS PO) Take  by mouth. occasional      CHOLECALCIFEROL, VITAMIN D3, (VITAMIN D3 PO) Take 5,000 Int'l Units by mouth daily.

## 2019-03-01 ENCOUNTER — CLINICAL SUPPORT (OUTPATIENT)
Dept: BARIATRICS/WEIGHT MGMT | Age: 64
End: 2019-03-01

## 2019-03-01 VITALS
HEART RATE: 71 BPM | SYSTOLIC BLOOD PRESSURE: 130 MMHG | DIASTOLIC BLOOD PRESSURE: 80 MMHG | BODY MASS INDEX: 31.93 KG/M2 | HEIGHT: 68 IN

## 2019-03-01 DIAGNOSIS — E66.01 SEVERE OBESITY (HCC): ICD-10-CM

## 2019-03-01 DIAGNOSIS — E78.2 MIXED HYPERLIPIDEMIA: Primary | ICD-10-CM

## 2019-03-01 DIAGNOSIS — E88.81 INSULIN RESISTANCE: ICD-10-CM

## 2019-03-01 DIAGNOSIS — E16.1 HYPERINSULINEMIA: ICD-10-CM

## 2019-03-01 NOTE — PROGRESS NOTES
Progress Note: Weekly Education Class in the [facility] Loss Program   Is there anything that you or the patient needs to let Dr. Maame Salinas know about? no  Over the past week, have you experienced any side-effects? Yes, headache    Chris Whelan is a 59 y.o. female who is enrolled in [facility] Program    Visit Vitals  /80   Pulse 71   Ht 5' 8\" (1.727 m)   BMI 31.93 kg/m²     Weight Metrics 3/1/2019 2/18/2019 2/18/2019 2/15/2019 2/8/2019 2/1/2019 1/21/2019   Weight - - 210 lb 210 lb 4.8 oz 214 lb 3.2 oz 214 lb 8 oz -   Neck Circ (inches) - 14 - - - - -   Waist Measure Inches 39 40 - - - 39.5 40.5   BMI 31.93 kg/m2 - 31.93 kg/m2 31.98 kg/m2 32.57 kg/m2 32.61 kg/m2 -         Have you received any other medical care this week? no  If yes, where and for what? Have you had any change in your medications since your last visit? no  If yes what? Did you have any problems adhering to the program last week? no  If yes, please explain:       Eating Habits Over Last Week:  Did you take in 64 oz of non-caloric fluids?  yes     Did you consume your 4 meal replacements each day? yes     Mood: okay    Physical Activity Over the Past Week:    Aerobic exercise: 0 min  Resistance exercise: 0 workouts / week

## 2019-03-08 ENCOUNTER — CLINICAL SUPPORT (OUTPATIENT)
Dept: BARIATRICS/WEIGHT MGMT | Age: 64
End: 2019-03-08

## 2019-03-08 VITALS
HEIGHT: 68 IN | BODY MASS INDEX: 31.52 KG/M2 | WEIGHT: 208 LBS | DIASTOLIC BLOOD PRESSURE: 71 MMHG | SYSTOLIC BLOOD PRESSURE: 134 MMHG | HEART RATE: 82 BPM

## 2019-03-08 DIAGNOSIS — E66.01 SEVERE OBESITY (HCC): ICD-10-CM

## 2019-03-08 DIAGNOSIS — E88.81 INSULIN RESISTANCE: ICD-10-CM

## 2019-03-08 DIAGNOSIS — E16.1 HYPERINSULINEMIA: ICD-10-CM

## 2019-03-08 DIAGNOSIS — E78.2 MIXED HYPERLIPIDEMIA: Primary | ICD-10-CM

## 2019-03-08 NOTE — PROGRESS NOTES
Progress Note: Weekly Education Class in the [facility] Weight Loss Program   Is there anything that you or the patient needs to let Dr. Xander Pan know about? no  Over the past week, have you experienced any side-effects? no    Marcella Lee is a 59 y.o. female who is enrolled in [facility]    Visit Vitals  /71   Pulse 82   Ht 5' 8\" (1.727 m)   Wt 208 lb (94.3 kg)   BMI 31.63 kg/m²     Weight Metrics 3/8/2019 3/1/2019 2/18/2019 2/18/2019 2/15/2019 2/8/2019 2/1/2019   Weight 208 lb - - 210 lb 210 lb 4.8 oz 214 lb 3.2 oz 214 lb 8 oz   Neck Circ (inches) - - 14 - - - -   Waist Measure Inches 38.5 39 40 - - - 39.5   BMI 31.63 kg/m2 31.93 kg/m2 - 31.93 kg/m2 31.98 kg/m2 32.57 kg/m2 32.61 kg/m2         Have you received any other medical care this week? no  If yes, where and for what? Have you had any change in your medications since your last visit? no  If yes what? Did you have any problems adhering to the program last week? no  If yes, please explain:       Eating Habits Over Last Week:  Did you take in 64 oz of non-caloric fluids?  yes     Did you consume your 4 meal replacements each day? yes     Mood: tired    Physical Activity Over the Past Week:    Aerobic exercise: 90 min  Resistance exercise: 0 workouts / week

## 2019-03-13 NOTE — PROGRESS NOTES
Nurse note from patient's weekly VLCD / LCD / Maintenance class was reviewed. Pertinent medical concerns were:   none     BP Readings from Last 3 Encounters:   03/08/19 134/71   03/01/19 130/80   02/18/19 119/74       Weight Metrics 3/8/2019 3/1/2019 2/18/2019 2/18/2019 2/15/2019 2/8/2019 2/1/2019   Weight 208 lb - - 210 lb 210 lb 4.8 oz 214 lb 3.2 oz 214 lb 8 oz   Neck Circ (inches) - - 14 - - - -   Waist Measure Inches 38.5 39 40 - - - 39.5   BMI 31.63 kg/m2 31.93 kg/m2 - 31.93 kg/m2 31.98 kg/m2 32.57 kg/m2 32.61 kg/m2       Current Outpatient Medications   Medication Sig Dispense Refill    docusate sodium (COLACE) 100 mg capsule Take 1 Cap by mouth two (2) times a day for 90 days. 60 Cap 2    lisinopril (PRINIVIL, ZESTRIL) 10 mg tablet Take  by mouth daily.  co-enzyme Q-10 (CO Q-10) 100 mg capsule Take 100 mg by mouth daily.  aspirin delayed-release 81 mg tablet Take 81 mg by mouth two (2) times a week.  OTHER,NON-FORMULARY, Pancreatic Enzymes 500mg 1 capsule with meals      TURMERIC, BULK, Take 500 mg by mouth. occasional      OTHER,NON-FORMULARY, Gallbladder formula 1 capsule 2-3 times a day with meals      LACTOBACILLUS ACIDOPHILUS (PROBIOTIC PO) Take 1 Cap by mouth two (2) times a day.  COCONUT OIL PO Take  by mouth. occasional      thyroid, Pork, (ARMOUR THYROID) 60 mg tablet Take 60 mg by mouth daily.  MULTIVITAMIN (MULTIPLE VITAMINS PO) Take  by mouth. occasional      CHOLECALCIFEROL, VITAMIN D3, (VITAMIN D3 PO) Take 5,000 Int'l Units by mouth daily.

## 2019-03-15 ENCOUNTER — CLINICAL SUPPORT (OUTPATIENT)
Dept: BARIATRICS/WEIGHT MGMT | Age: 64
End: 2019-03-15

## 2019-03-15 VITALS
WEIGHT: 208 LBS | HEART RATE: 74 BPM | BODY MASS INDEX: 31.52 KG/M2 | HEIGHT: 68 IN | SYSTOLIC BLOOD PRESSURE: 130 MMHG | DIASTOLIC BLOOD PRESSURE: 78 MMHG

## 2019-03-15 DIAGNOSIS — E66.01 SEVERE OBESITY (HCC): ICD-10-CM

## 2019-03-15 DIAGNOSIS — E16.1 HYPERINSULINEMIA: ICD-10-CM

## 2019-03-15 DIAGNOSIS — E78.2 MIXED HYPERLIPIDEMIA: Primary | ICD-10-CM

## 2019-03-15 NOTE — PROGRESS NOTES
Progress Note: Weekly Education Class in the [facility] Weight Loss Program   Is there anything that you or the patient needs to let Dr. Ciara Adams know about? no  Over the past week, have you experienced any side-effects? Yes, fatigue, cold intolerance    Christel Linares is a 59 y.o. female who is enrolled in [facility] Program    Visit Vitals  /78   Pulse 74   Ht 5' 8\" (1.727 m)   Wt 208 lb (94.3 kg)   BMI 31.63 kg/m²     Weight Metrics 3/15/2019 3/8/2019 3/1/2019 2/18/2019 2/18/2019 2/15/2019 2/8/2019   Weight 208 lb 208 lb - - 210 lb 210 lb 4.8 oz 214 lb 3.2 oz   Neck Circ (inches) - - - 14 - - -   Waist Measure Inches - 38.5 39 40 - - -   BMI 31.63 kg/m2 31.63 kg/m2 31.93 kg/m2 - 31.93 kg/m2 31.98 kg/m2 32.57 kg/m2         Have you received any other medical care this week? no  If yes, where and for what? Have you had any change in your medications since your last visit? no  If yes what? Did you have any problems adhering to the program last week? yes  If yes, please explain:       Eating Habits Over Last Week:  Did you take in 64 oz of non-caloric fluids?  yes     Did you consume your 4 meal replacements each day? yes     Mood: tired    Physical Activity Over the Past Week:    Aerobic exercise: 0 min  Resistance exercise: 0 workouts / week

## 2019-03-18 ENCOUNTER — OFFICE VISIT (OUTPATIENT)
Dept: FAMILY MEDICINE CLINIC | Age: 64
End: 2019-03-18

## 2019-03-18 VITALS
TEMPERATURE: 97.7 F | SYSTOLIC BLOOD PRESSURE: 124 MMHG | WEIGHT: 203.5 LBS | HEIGHT: 68 IN | OXYGEN SATURATION: 97 % | RESPIRATION RATE: 18 BRPM | BODY MASS INDEX: 30.84 KG/M2 | HEART RATE: 87 BPM | DIASTOLIC BLOOD PRESSURE: 74 MMHG

## 2019-03-18 DIAGNOSIS — E88.81 INSULIN RESISTANCE: ICD-10-CM

## 2019-03-18 DIAGNOSIS — E16.1 HYPERINSULINEMIA: ICD-10-CM

## 2019-03-18 DIAGNOSIS — E78.2 MIXED HYPERLIPIDEMIA: Primary | ICD-10-CM

## 2019-03-18 DIAGNOSIS — E66.9 OBESITY (BMI 35.0-39.9 WITHOUT COMORBIDITY): ICD-10-CM

## 2019-03-18 NOTE — PROGRESS NOTES
Nurse note from patient's weekly VLCD / LCD / Maintenance class was reviewed. Pertinent medical concerns were:      prob eating some carbs which makes the insulin go up and lead to fatigue as a result of blood sugar drops  Remedy: avoid carbs while on this plan  BP Readings from Last 3 Encounters:   03/15/19 130/78   03/08/19 134/71   03/01/19 130/80       Weight Metrics 3/15/2019 3/8/2019 3/1/2019 2/18/2019 2/18/2019 2/15/2019 2/8/2019   Weight 208 lb 208 lb - - 210 lb 210 lb 4.8 oz 214 lb 3.2 oz   Neck Circ (inches) - - - 14 - - -   Waist Measure Inches - 38.5 39 40 - - -   BMI 31.63 kg/m2 31.63 kg/m2 31.93 kg/m2 - 31.93 kg/m2 31.98 kg/m2 32.57 kg/m2       Current Outpatient Medications   Medication Sig Dispense Refill    docusate sodium (COLACE) 100 mg capsule Take 1 Cap by mouth two (2) times a day for 90 days. 60 Cap 2    lisinopril (PRINIVIL, ZESTRIL) 10 mg tablet Take  by mouth daily.  co-enzyme Q-10 (CO Q-10) 100 mg capsule Take 100 mg by mouth daily.  aspirin delayed-release 81 mg tablet Take 81 mg by mouth two (2) times a week.  OTHER,NON-FORMULARY, Pancreatic Enzymes 500mg 1 capsule with meals      TURMERIC, BULK, Take 500 mg by mouth. occasional      OTHER,NON-FORMULARY, Gallbladder formula 1 capsule 2-3 times a day with meals      LACTOBACILLUS ACIDOPHILUS (PROBIOTIC PO) Take 1 Cap by mouth two (2) times a day.  COCONUT OIL PO Take  by mouth. occasional      thyroid, Pork, (ARMOUR THYROID) 60 mg tablet Take 60 mg by mouth daily.  MULTIVITAMIN (MULTIPLE VITAMINS PO) Take  by mouth. occasional      CHOLECALCIFEROL, VITAMIN D3, (VITAMIN D3 PO) Take 5,000 Int'l Units by mouth daily.

## 2019-03-18 NOTE — PROGRESS NOTES
1. Have you been to the ER, urgent care clinic since your last visit? Hospitalized since your last visit? No    2. Have you seen or consulted any other health care providers outside of the [address] [place] [address] since your last visit? Include any pap smears or colon screening.  No    Chief Complaint   Patient presents with    Weight Management     Body Weight: 203.5  Body Fat%: 39.8  Muscle Mass Weight: 30.9  Body Water Weight: 67.3  Basal Metabolic Rate: 1152  BMI: 30.94

## 2019-03-18 NOTE — PROGRESS NOTES
[facility] Progress Note:   F/up Physician Visit    CC: Weight Management      Evan Andino is a 59 y.o. female who is here for her  f/up physician visit for the VLCD / LCD Program.          Weight Metrics 3/18/2019 3/18/2019 3/15/2019 3/8/2019 3/1/2019 2/18/2019 2/18/2019   Weight - 203 lb 8 oz 208 lb 208 lb - - 210 lb   Neck Circ (inches) 14 - - - - 14 -   Waist Measure Inches 38 - - 38.5 39 40 -   BMI - 30.94 kg/m2 31.63 kg/m2 31.63 kg/m2 31.93 kg/m2 - 31.93 kg/m2         Outpatient Medications Marked as Taking for the 3/18/19 encounter (Office Visit) with Rajendra Noyola MD   Medication Sig Dispense Refill    docusate sodium (COLACE) 100 mg capsule Take 1 Cap by mouth two (2) times a day for 90 days. 60 Cap 2    OTHER,NON-FORMULARY, Pancreatic Enzymes 500mg 1 capsule with meals      thyroid, Pork, (ARMOUR THYROID) 60 mg tablet Take 60 mg by mouth daily.  MULTIVITAMIN (MULTIPLE VITAMINS PO) Take  by mouth. occasional           Participation   Did you attend clinic and class last week? no    Review of Systems  Since your last visit, have you experienced any complications? no  If yes, please list:       Are you taking an appetite suppressant? no  If so, is there any Chest Pain, Palpitations or Dizziness? BP Readings from Last 3 Encounters:   03/18/19 124/74   03/15/19 130/78   03/08/19 134/71       SLEEP:    Have you received any other medical care this week? no  If yes, where and for what? Have you discontinued or changed any medicine or dose of your medicine since your last visit with Dr Zhanna Ramirez? no  If yes, where and for what? Diet  How many ounces of calorie-free liquids did you consume each day? 64 oz    How many meal replacements did you take each day?  4    Did you have any problems adhering to the program?  no If yes, please explain:      Exercise  Aerobic exercise: none last week usually does a little rebounding min  Resistance exercise: 0 workouts / week  Any discomfort?  no     If yes, where? Objective  Visit Vitals  /74   Pulse 87   Temp 97.7 °F (36.5 °C) (Oral)   Resp 18   Ht 5' 8\" (1.727 m)   Wt 203 lb 8 oz (92.3 kg)   SpO2 97%   BMI 30.94 kg/m²     No LMP recorded. Patient has had a hysterectomy. Physical Exam  Appearance: well,   Mental:A&O x 3, NAD  H:NC/AT,  EENT:   EOMI, PERRL, No scleral icterus  Neck: no bruit or JVD  Lung: clear, No W/R  ABD: soft, active, nontender  Ext:  no Edema  Neuro: nonfocal  Assessment / Plan    Encounter Diagnoses   Name Primary?  Mixed hyperlipidemia Yes    Obesity (BMI 35.0-39.9 without comorbidity)     Hyperinsulinemia     Insulin resistance      Diagnoses and all orders for this visit:    1. Mixed hyperlipidemia  Cont to monitor, this month is a little higher. Seems to be eating something outside of the program  Asking her to document on homework  2. Obesity (BMI 35.0-39.9 without comorbidity)  Doing well , coming down nicely  3. Hyperinsulinemia  better  4. Insulin resistance      1. Weight management improved   Progress was reviewed with patient    2. Labs    Latest results reviewed with patient   Lab slip given to pt for f/up HDL labs      Over 20 minutes of the 30 minutes face to face time with Diamond Micheal consisted of counseling & coordinating and/or discussing treatment plans in reference to her obesity The primary encounter diagnosis was Mixed hyperlipidemia. Diagnoses of Obesity (BMI 35.0-39.9 without comorbidity), Hyperinsulinemia, and Insulin resistance were also pertinent to this visit.

## 2019-03-28 NOTE — PROGRESS NOTES
Nurse note from patient's weekly VLCD / LCD / Maintenance class was reviewed. Pertinent medical concerns were:   none     BP Readings from Last 3 Encounters:   03/18/19 124/74   03/15/19 130/78   03/08/19 134/71       Weight Metrics 3/18/2019 3/18/2019 3/15/2019 3/8/2019 3/1/2019 2/18/2019 2/18/2019   Weight - 203 lb 8 oz 208 lb 208 lb - - 210 lb   Neck Circ (inches) 14 - - - - 14 -   Waist Measure Inches 38 - - 38.5 39 40 -   BMI - 30.94 kg/m2 31.63 kg/m2 31.63 kg/m2 31.93 kg/m2 - 31.93 kg/m2       Current Outpatient Medications   Medication Sig Dispense Refill    docusate sodium (COLACE) 100 mg capsule Take 1 Cap by mouth two (2) times a day for 90 days. 60 Cap 2    lisinopril (PRINIVIL, ZESTRIL) 10 mg tablet Take  by mouth daily.  co-enzyme Q-10 (CO Q-10) 100 mg capsule Take 100 mg by mouth daily.  aspirin delayed-release 81 mg tablet Take 81 mg by mouth two (2) times a week.  OTHER,NON-FORMULARY, Pancreatic Enzymes 500mg 1 capsule with meals      TURMERIC, BULK, Take 500 mg by mouth. occasional      OTHER,NON-FORMULARY, Gallbladder formula 1 capsule 2-3 times a day with meals      LACTOBACILLUS ACIDOPHILUS (PROBIOTIC PO) Take 1 Cap by mouth two (2) times a day.  COCONUT OIL PO Take  by mouth. occasional      thyroid, Pork, (ARMOUR THYROID) 60 mg tablet Take 60 mg by mouth daily.  MULTIVITAMIN (MULTIPLE VITAMINS PO) Take  by mouth. occasional      CHOLECALCIFEROL, VITAMIN D3, (VITAMIN D3 PO) Take 5,000 Int'l Units by mouth daily.

## 2019-03-29 ENCOUNTER — CLINICAL SUPPORT (OUTPATIENT)
Dept: BARIATRICS/WEIGHT MGMT | Age: 64
End: 2019-03-29

## 2019-03-29 VITALS
WEIGHT: 200.9 LBS | BODY MASS INDEX: 30.45 KG/M2 | SYSTOLIC BLOOD PRESSURE: 116 MMHG | DIASTOLIC BLOOD PRESSURE: 75 MMHG | HEIGHT: 68 IN | HEART RATE: 73 BPM

## 2019-03-29 DIAGNOSIS — E16.1 HYPERINSULINEMIA: ICD-10-CM

## 2019-03-29 DIAGNOSIS — E66.01 SEVERE OBESITY (HCC): ICD-10-CM

## 2019-03-29 DIAGNOSIS — E88.81 INSULIN RESISTANCE: ICD-10-CM

## 2019-03-29 DIAGNOSIS — E78.2 MIXED HYPERLIPIDEMIA: Primary | ICD-10-CM

## 2019-03-29 NOTE — PROGRESS NOTES
Progress Note: Weekly Education Class in the [facility] Loss Program   Is there anything that you or the patient needs to let Dr. Fitz Camargo know about? no  Over the past week, have you experienced any side-effects? no    Vinod Arriola is a 59 y.o. female who is enrolled in [facility]    Visit Vitals  /75   Pulse 73   Ht 5' 8\" (1.727 m)   Wt 200 lb 14.4 oz (91.1 kg)   BMI 30.55 kg/m²     Weight Metrics 3/29/2019 3/18/2019 3/18/2019 3/15/2019 3/8/2019 3/1/2019 2/18/2019   Weight 200 lb 14.4 oz - 203 lb 8 oz 208 lb 208 lb - -   Neck Circ (inches) - 14 - - - - 14   Waist Measure Inches 37 38 - - 38.5 39 40   BMI 30.55 kg/m2 - 30.94 kg/m2 31.63 kg/m2 31.63 kg/m2 31.93 kg/m2 -         Have you received any other medical care this week? no  If yes, where and for what? Have you had any change in your medications since your last visit? no  If yes what? Did you have any problems adhering to the program last week? no  If yes, please explain:       Eating Habits Over Last Week:  Did you take in 64 oz of non-caloric fluids?  yes     Did you consume your 4 meal replacements each day? yes     Mood: tired    Physical Activity Over the Past Week:    Aerobic exercise: 80 min  Resistance exercise: 0 workouts / week

## 2019-03-31 NOTE — PROGRESS NOTES
Nurse note from patient's weekly VLCD / LCD / Maintenance class was reviewed. Pertinent medical concerns were:   none     BP Readings from Last 3 Encounters:   03/29/19 116/75   03/18/19 124/74   03/15/19 130/78       Weight Metrics 3/29/2019 3/18/2019 3/18/2019 3/15/2019 3/8/2019 3/1/2019 2/18/2019   Weight 200 lb 14.4 oz - 203 lb 8 oz 208 lb 208 lb - -   Neck Circ (inches) - 14 - - - - 14   Waist Measure Inches 37 38 - - 38.5 39 40   BMI 30.55 kg/m2 - 30.94 kg/m2 31.63 kg/m2 31.63 kg/m2 31.93 kg/m2 -       Current Outpatient Medications   Medication Sig Dispense Refill    docusate sodium (COLACE) 100 mg capsule Take 1 Cap by mouth two (2) times a day for 90 days. 60 Cap 2    lisinopril (PRINIVIL, ZESTRIL) 10 mg tablet Take  by mouth daily.  co-enzyme Q-10 (CO Q-10) 100 mg capsule Take 100 mg by mouth daily.  aspirin delayed-release 81 mg tablet Take 81 mg by mouth two (2) times a week.  OTHER,NON-FORMULARY, Pancreatic Enzymes 500mg 1 capsule with meals      TURMERIC, BULK, Take 500 mg by mouth. occasional      OTHER,NON-FORMULARY, Gallbladder formula 1 capsule 2-3 times a day with meals      LACTOBACILLUS ACIDOPHILUS (PROBIOTIC PO) Take 1 Cap by mouth two (2) times a day.  COCONUT OIL PO Take  by mouth. occasional      thyroid, Pork, (ARMOUR THYROID) 60 mg tablet Take 60 mg by mouth daily.  MULTIVITAMIN (MULTIPLE VITAMINS PO) Take  by mouth. occasional      CHOLECALCIFEROL, VITAMIN D3, (VITAMIN D3 PO) Take 5,000 Int'l Units by mouth daily.

## 2019-04-05 ENCOUNTER — CLINICAL SUPPORT (OUTPATIENT)
Dept: BARIATRICS/WEIGHT MGMT | Age: 64
End: 2019-04-05

## 2019-04-05 VITALS
SYSTOLIC BLOOD PRESSURE: 132 MMHG | WEIGHT: 201.9 LBS | BODY MASS INDEX: 30.6 KG/M2 | HEIGHT: 68 IN | DIASTOLIC BLOOD PRESSURE: 77 MMHG | HEART RATE: 70 BPM

## 2019-04-05 DIAGNOSIS — E88.81 INSULIN RESISTANCE: ICD-10-CM

## 2019-04-05 DIAGNOSIS — E66.01 SEVERE OBESITY (HCC): ICD-10-CM

## 2019-04-05 DIAGNOSIS — E16.1 HYPERINSULINEMIA: ICD-10-CM

## 2019-04-05 DIAGNOSIS — E78.2 MIXED HYPERLIPIDEMIA: Primary | ICD-10-CM

## 2019-04-05 NOTE — PROGRESS NOTES
Progress Note: Weekly Education Class in the [facility] Loss Program   Is there anything that you or the patient needs to let Dr. Xochitl Garcia know about? no  Over the past week, have you experienced any side-effects? Yes, cold intolerance, fatigue. Melinda Lewis is a 59 y.o. female who is enrolled in [facility]    Visit Vitals  /77   Pulse 70   Ht 5' 8\" (1.727 m)   Wt 201 lb 14.4 oz (91.6 kg)   BMI 30.70 kg/m²     Weight Metrics 4/5/2019 3/29/2019 3/18/2019 3/18/2019 3/15/2019 3/8/2019 3/1/2019   Weight 201 lb 14.4 oz 200 lb 14.4 oz - 203 lb 8 oz 208 lb 208 lb -   Neck Circ (inches) - - 14 - - - -   Waist Measure Inches 37.5 37 38 - - 38.5 39   BMI 30.7 kg/m2 30.55 kg/m2 - 30.94 kg/m2 31.63 kg/m2 31.63 kg/m2 31.93 kg/m2         Have you received any other medical care this week? no  If yes, where and for what? Have you had any change in your medications since your last visit? no  If yes what? Did you have any problems adhering to the program last week? no  If yes, please explain:       Eating Habits Over Last Week:  Did you take in 64 oz of non-caloric fluids?  yes     Did you consume your 4 meal replacements each day? yes     Mood: fatigue    Physical Activity Over the Past Week:    Aerobic exercise: 65 min  Resistance exercise: 0 workouts / week

## 2019-04-08 NOTE — PROGRESS NOTES
Nurse note from patient's weekly VLCD / LCD / Maintenance class was reviewed. Pertinent medical concerns were:   none     BP Readings from Last 3 Encounters:   04/05/19 132/77   03/29/19 116/75   03/18/19 124/74       Weight Metrics 4/5/2019 3/29/2019 3/18/2019 3/18/2019 3/15/2019 3/8/2019 3/1/2019   Weight 201 lb 14.4 oz 200 lb 14.4 oz - 203 lb 8 oz 208 lb 208 lb -   Neck Circ (inches) - - 14 - - - -   Waist Measure Inches 37.5 37 38 - - 38.5 39   BMI 30.7 kg/m2 30.55 kg/m2 - 30.94 kg/m2 31.63 kg/m2 31.63 kg/m2 31.93 kg/m2       Current Outpatient Medications   Medication Sig Dispense Refill    docusate sodium (COLACE) 100 mg capsule Take 1 Cap by mouth two (2) times a day for 90 days. 60 Cap 2    lisinopril (PRINIVIL, ZESTRIL) 10 mg tablet Take  by mouth daily.  co-enzyme Q-10 (CO Q-10) 100 mg capsule Take 100 mg by mouth daily.  aspirin delayed-release 81 mg tablet Take 81 mg by mouth two (2) times a week.  OTHER,NON-FORMULARY, Pancreatic Enzymes 500mg 1 capsule with meals      TURMERIC, BULK, Take 500 mg by mouth. occasional      OTHER,NON-FORMULARY, Gallbladder formula 1 capsule 2-3 times a day with meals      LACTOBACILLUS ACIDOPHILUS (PROBIOTIC PO) Take 1 Cap by mouth two (2) times a day.  COCONUT OIL PO Take  by mouth. occasional      thyroid, Pork, (ARMOUR THYROID) 60 mg tablet Take 60 mg by mouth daily.  MULTIVITAMIN (MULTIPLE VITAMINS PO) Take  by mouth. occasional      CHOLECALCIFEROL, VITAMIN D3, (VITAMIN D3 PO) Take 5,000 Int'l Units by mouth daily.

## 2019-04-12 ENCOUNTER — CLINICAL SUPPORT (OUTPATIENT)
Dept: BARIATRICS/WEIGHT MGMT | Age: 64
End: 2019-04-12

## 2019-04-12 VITALS
BODY MASS INDEX: 30.28 KG/M2 | HEART RATE: 83 BPM | DIASTOLIC BLOOD PRESSURE: 73 MMHG | WEIGHT: 199.8 LBS | HEIGHT: 68 IN | SYSTOLIC BLOOD PRESSURE: 125 MMHG

## 2019-04-12 DIAGNOSIS — E66.01 SEVERE OBESITY (HCC): ICD-10-CM

## 2019-04-12 DIAGNOSIS — E88.81 INSULIN RESISTANCE: ICD-10-CM

## 2019-04-12 DIAGNOSIS — E78.2 MIXED HYPERLIPIDEMIA: Primary | ICD-10-CM

## 2019-04-12 DIAGNOSIS — E16.1 HYPERINSULINEMIA: ICD-10-CM

## 2019-04-12 NOTE — PROGRESS NOTES
Progress Note: Weekly Education Class in the [facility] Weight Loss Program   Is there anything that you or the patient needs to let Dr. Roz Medina know about? no  Over the past week, have you experienced any side-effects? Yes, weakness, fatigue. Shanna Echeverria is a 59 y.o. female who is enrolled in [facility]    Visit Vitals  /73   Pulse 83   Ht 5' 8\" (1.727 m)   Wt 199 lb 12.8 oz (90.6 kg)   BMI 30.38 kg/m²     Weight Metrics 4/12/2019 4/5/2019 3/29/2019 3/18/2019 3/18/2019 3/15/2019 3/8/2019   Weight 199 lb 12.8 oz 201 lb 14.4 oz 200 lb 14.4 oz - 203 lb 8 oz 208 lb 208 lb   Neck Circ (inches) - - - 14 - - -   Waist Measure Inches - 37.5 37 38 - - 38.5   BMI 30.38 kg/m2 30.7 kg/m2 30.55 kg/m2 - 30.94 kg/m2 31.63 kg/m2 31.63 kg/m2         Have you received any other medical care this week? no  If yes, where and for what? Have you had any change in your medications since your last visit? no  If yes what? Did you have any problems adhering to the program last week? yes  If yes, please explain: some days hungry      Eating Habits Over Last Week:  Did you take in 64 oz of non-caloric fluids?  yes     Did you consume your 4 meal replacements each day? yes     Mood: tired    Physical Activity Over the Past Week:    Aerobic exercise: 0 min  Resistance exercise: 0 workouts / week

## 2019-04-15 ENCOUNTER — OFFICE VISIT (OUTPATIENT)
Dept: FAMILY MEDICINE CLINIC | Age: 64
End: 2019-04-15

## 2019-04-15 VITALS
RESPIRATION RATE: 18 BRPM | WEIGHT: 197.6 LBS | DIASTOLIC BLOOD PRESSURE: 70 MMHG | HEART RATE: 69 BPM | TEMPERATURE: 97.9 F | HEIGHT: 68 IN | BODY MASS INDEX: 29.95 KG/M2 | OXYGEN SATURATION: 96 % | SYSTOLIC BLOOD PRESSURE: 123 MMHG

## 2019-04-15 DIAGNOSIS — E88.81 INSULIN RESISTANCE: ICD-10-CM

## 2019-04-15 DIAGNOSIS — E66.9 OBESITY (BMI 35.0-39.9 WITHOUT COMORBIDITY): ICD-10-CM

## 2019-04-15 DIAGNOSIS — E16.1 HYPERINSULINEMIA: ICD-10-CM

## 2019-04-15 DIAGNOSIS — E78.2 MIXED HYPERLIPIDEMIA: Primary | ICD-10-CM

## 2019-04-15 NOTE — PROGRESS NOTES
1. Have you been to the ER, urgent care clinic since your last visit? Hospitalized since your last visit? No 
 
2. Have you seen or consulted any other health care providers outside of the [address] [place] [address] since your last visit? Include any pap smears or colon screening. No  
 
Chief Complaint Patient presents with  Weight Management Body Weight: 197.6 Body Fat%: 39.4 Muscle Mass Weight: 29.9 Body Water Weight: 44.7 Basal Metabolic Rate: 1908 BMI: 30.04

## 2019-04-15 NOTE — PROGRESS NOTES
[facility] Progress Note:  
F/up Physician Visit CC: Weight Management Yonathan Lundberg is a 59 y.o. female who is here for her  f/up physician visit for the VLCD / LCD Program. 
She has trouble sleeping C/o feeling tired often and drinks an energy drink prn 
 
 
 
 
Weight Metrics 2019 2019 4/15/2019 4/15/2019 2019 2019 3/29/2019 Weight - 198 lb - 197 lb 9.6 oz 199 lb 12.8 oz 201 lb 14.4 oz 200 lb 14.4 oz Neck Circ (inches) - - 13.5 - - - - Waist Measure Inches 38 - 36 - - 37.5 37 BMI - 30.11 kg/m2 - 30.04 kg/m2 30.38 kg/m2 30.7 kg/m2 30.55 kg/m2 Outpatient Medications Marked as Taking for the 4/15/19 encounter (Office Visit) with Reba Lowry MD  
Medication Sig Dispense Refill  [] docusate sodium (COLACE) 100 mg capsule Take 1 Cap by mouth two (2) times a day for 90 days. 60 Cap 2  
 OTHER,NON-FORMULARY, Pancreatic Enzymes 500mg 1 capsule with meals  thyroid, Pork, (ARMOUR THYROID) 60 mg tablet Take 60 mg by mouth daily.  MULTIVITAMIN (MULTIPLE VITAMINS PO) Take  by mouth. occasional    
 
 
Participation Did you attend clinic and class last week? no 
 
Review of Systems Since your last visit, have you experienced any complications? no If yes, please list:  
 
 
Are you taking an appetite suppressant? no 
If so, is there any Chest Pain, Palpitations or Dizziness? BP Readings from Last 3 Encounters:  
19 136/67  
04/15/19 123/70  
19 125/73 SLEEP: 
 
Have you received any other medical care this week? no  If yes, where and for what? Have you discontinued or changed any medicine or dose of your medicine since your last visit with Dr Stevan Villanueva? no  If yes, where and for what? Diet How many ounces of calorie-free liquids did you consume each day? 64 oz How many meal replacements did you take each day? 4 Did you have any problems adhering to the program?  no If yes, please explain: Exercise Aerobic exercise: 0 min Resistance exercise: no workouts / week Any discomfort?  no If yes, where? Objective Visit Vitals /70 Pulse 69 Temp 97.9 °F (36.6 °C) (Oral) Resp 18 Ht 5' 8\" (1.727 m) Wt 197 lb 9.6 oz (89.6 kg) SpO2 96% BMI 30.04 kg/m² No LMP recorded. Patient has had a hysterectomy. Physical Exam 
Appearance: *well Mental:A&O x 3, NAD H:NC/AT, 
EENT:   EOMI, PERRL, No scleral icterus Neck: no bruit or JVD Lung: clear, No W/R 
ABD: soft, active, nontender Ext:  no Edema Neuro: nonfocal 
Assessment / Plan Encounter Diagnoses Name Primary?  Mixed hyperlipidemia Yes  Obesity (BMI 35.0-39.9 without comorbidity)  Hyperinsulinemia  Insulin resistance Diagnoses and all orders for this visit: 
 
1. Mixed hyperlipidemia 
improving 2. Obesity (BMI 35.0-39.9 without comorbidity) Started at 0 and is now 197, not moving as fast as usual.  
Sleep is likely a part of the problem 3. Hyperinsulinemia Cont to monitor 4. Insulin resistance Cont to monitor 1. Weight management improved Progress was reviewed with patient 2. Labs Latest results reviewed with patient Lab slip given to pt for f/up HDL labs Over 20 minutes of the 30 minutes face to face time with Fany Jacobo consisted of counseling & coordinating and/or discussing treatment plans in reference to her obesity The primary encounter diagnosis was Mixed hyperlipidemia. Diagnoses of Obesity (BMI 35.0-39.9 without comorbidity), Hyperinsulinemia, and Insulin resistance were also pertinent to this visit.

## 2019-04-26 ENCOUNTER — CLINICAL SUPPORT (OUTPATIENT)
Dept: BARIATRICS/WEIGHT MGMT | Age: 64
End: 2019-04-26

## 2019-04-26 VITALS
SYSTOLIC BLOOD PRESSURE: 136 MMHG | BODY MASS INDEX: 30.01 KG/M2 | HEIGHT: 68 IN | DIASTOLIC BLOOD PRESSURE: 67 MMHG | WEIGHT: 198 LBS

## 2019-04-26 DIAGNOSIS — E16.1 HYPERINSULINEMIA: ICD-10-CM

## 2019-04-26 DIAGNOSIS — E66.01 SEVERE OBESITY (HCC): ICD-10-CM

## 2019-04-26 DIAGNOSIS — E78.2 MIXED HYPERLIPIDEMIA: Primary | ICD-10-CM

## 2019-04-26 DIAGNOSIS — E88.81 INSULIN RESISTANCE: ICD-10-CM

## 2019-04-26 NOTE — PROGRESS NOTES
Progress Note: Weekly Education Class in the [facility] Weight Loss Program   Is there anything that you or the patient needs to let Dr. Romulo Barber know about? no  Over the past week, have you experienced any side-effects? no    Rico De Dios is a 59 y.o. female who is enrolled in [facility]    Visit Vitals  /67   Ht 5' 8\" (1.727 m)   Wt 198 lb (89.8 kg)   BMI 30.11 kg/m²     Weight Metrics 4/26/2019 4/15/2019 4/15/2019 4/12/2019 4/5/2019 3/29/2019 3/18/2019   Weight 198 lb - 197 lb 9.6 oz 199 lb 12.8 oz 201 lb 14.4 oz 200 lb 14.4 oz -   Neck Circ (inches) - 13.5 - - - - 14   Waist Measure Inches 38 36 - - 37.5 37 38   BMI 30.11 kg/m2 - 30.04 kg/m2 30.38 kg/m2 30.7 kg/m2 30.55 kg/m2 -         Have you received any other medical care this week? no  If yes, where and for what? Have you had any change in your medications since your last visit? no  If yes what? Did you have any problems adhering to the program last week? no  If yes, please explain:       Eating Habits Over Last Week:  Did you take in 64 oz of non-caloric fluids?  yes     Did you consume your 4 meal replacements each day? yes     Mood: tired    Physical Activity Over the Past Week:    Aerobic exercise: 0 min  Resistance exercise: 0 workouts / week

## 2019-04-27 NOTE — PROGRESS NOTES
Nurse note from patient's weekly VLCD / LCD / Maintenance class was reviewed. Pertinent medical concerns were:   none     BP Readings from Last 3 Encounters:   04/26/19 136/67   04/15/19 123/70   04/12/19 125/73       Weight Metrics 4/26/2019 4/26/2019 4/15/2019 4/15/2019 4/12/2019 4/5/2019 3/29/2019   Weight - 198 lb - 197 lb 9.6 oz 199 lb 12.8 oz 201 lb 14.4 oz 200 lb 14.4 oz   Neck Circ (inches) - - 13.5 - - - -   Waist Measure Inches 38 - 36 - - 37.5 37   BMI - 30.11 kg/m2 - 30.04 kg/m2 30.38 kg/m2 30.7 kg/m2 30.55 kg/m2       Current Outpatient Medications   Medication Sig Dispense Refill    lisinopril (PRINIVIL, ZESTRIL) 10 mg tablet Take  by mouth daily.  co-enzyme Q-10 (CO Q-10) 100 mg capsule Take 100 mg by mouth daily.  aspirin delayed-release 81 mg tablet Take 81 mg by mouth two (2) times a week.  OTHER,NON-FORMULARY, Pancreatic Enzymes 500mg 1 capsule with meals      TURMERIC, BULK, Take 500 mg by mouth. occasional      OTHER,NON-FORMULARY, Gallbladder formula 1 capsule 2-3 times a day with meals      LACTOBACILLUS ACIDOPHILUS (PROBIOTIC PO) Take 1 Cap by mouth two (2) times a day.  COCONUT OIL PO Take  by mouth. occasional      thyroid, Pork, (ARMOUR THYROID) 60 mg tablet Take 60 mg by mouth daily.  MULTIVITAMIN (MULTIPLE VITAMINS PO) Take  by mouth. occasional      CHOLECALCIFEROL, VITAMIN D3, (VITAMIN D3 PO) Take 5,000 Int'l Units by mouth daily.

## 2019-05-03 ENCOUNTER — CLINICAL SUPPORT (OUTPATIENT)
Dept: BARIATRICS/WEIGHT MGMT | Age: 64
End: 2019-05-03

## 2019-05-03 VITALS
WEIGHT: 196.2 LBS | HEIGHT: 68 IN | BODY MASS INDEX: 29.73 KG/M2 | DIASTOLIC BLOOD PRESSURE: 54 MMHG | SYSTOLIC BLOOD PRESSURE: 129 MMHG | HEART RATE: 77 BPM

## 2019-05-03 DIAGNOSIS — E88.81 INSULIN RESISTANCE: ICD-10-CM

## 2019-05-03 DIAGNOSIS — E78.2 MIXED HYPERLIPIDEMIA: Primary | ICD-10-CM

## 2019-05-03 DIAGNOSIS — E66.01 SEVERE OBESITY (HCC): ICD-10-CM

## 2019-05-03 DIAGNOSIS — E16.1 HYPERINSULINEMIA: ICD-10-CM

## 2019-05-03 NOTE — PROGRESS NOTES
Progress Note: Weekly Education Class in the [facility] Loss Program   Is there anything that you or the patient needs to let Dr. Melony Navarro know about? no  Over the past week, have you experienced any side-effects? yes    Fernando Mederos is a 59 y.o. female who is enrolled in [facility]    Visit Vitals  /54   Pulse 77   Ht 5' 8\" (1.727 m)   Wt 196 lb 3.2 oz (89 kg)   BMI 29.83 kg/m²     Weight Metrics 5/3/2019 4/26/2019 4/26/2019 4/15/2019 4/15/2019 4/12/2019 4/5/2019   Weight 196 lb 3.2 oz - 198 lb - 197 lb 9.6 oz 199 lb 12.8 oz 201 lb 14.4 oz   Neck Circ (inches) - - - 13.5 - - -   Waist Measure Inches 38 38 - 36 - - 37.5   BMI 29.83 kg/m2 - 30.11 kg/m2 - 30.04 kg/m2 30.38 kg/m2 30.7 kg/m2         Have you received any other medical care this week? no  If yes, where and for what? Have you had any change in your medications since your last visit? no  If yes what? Did you have any problems adhering to the program last week? no  If yes, please explain:       Eating Habits Over Last Week:  Did you take in 64 oz of non-caloric fluids?  yes     Did you consume your 4 meal replacements each day? yes     Mood: tired    Physical Activity Over the Past Week:    Aerobic exercise: 90 min  Resistance exercise: 2 workouts / week

## 2019-05-06 NOTE — PROGRESS NOTES
Nurse note from patient's weekly VLCD / LCD / Maintenance class was reviewed. Pertinent medical concerns were:   None       BP Readings from Last 3 Encounters:   05/03/19 129/54   04/26/19 136/67   04/15/19 123/70       Weight Metrics 5/3/2019 4/26/2019 4/26/2019 4/15/2019 4/15/2019 4/12/2019 4/5/2019   Weight 196 lb 3.2 oz - 198 lb - 197 lb 9.6 oz 199 lb 12.8 oz 201 lb 14.4 oz   Neck Circ (inches) - - - 13.5 - - -   Waist Measure Inches 38 38 - 36 - - 37.5   BMI 29.83 kg/m2 - 30.11 kg/m2 - 30.04 kg/m2 30.38 kg/m2 30.7 kg/m2       Current Outpatient Medications   Medication Sig Dispense Refill    lisinopril (PRINIVIL, ZESTRIL) 10 mg tablet Take  by mouth daily.  co-enzyme Q-10 (CO Q-10) 100 mg capsule Take 100 mg by mouth daily.  aspirin delayed-release 81 mg tablet Take 81 mg by mouth two (2) times a week.  OTHER,NON-FORMULARY, Pancreatic Enzymes 500mg 1 capsule with meals      TURMERIC, BULK, Take 500 mg by mouth. occasional      OTHER,NON-FORMULARY, Gallbladder formula 1 capsule 2-3 times a day with meals      LACTOBACILLUS ACIDOPHILUS (PROBIOTIC PO) Take 1 Cap by mouth two (2) times a day.  COCONUT OIL PO Take  by mouth. occasional      thyroid, Pork, (ARMOUR THYROID) 60 mg tablet Take 60 mg by mouth daily.  MULTIVITAMIN (MULTIPLE VITAMINS PO) Take  by mouth. occasional      CHOLECALCIFEROL, VITAMIN D3, (VITAMIN D3 PO) Take 5,000 Int'l Units by mouth daily.

## 2019-05-10 ENCOUNTER — CLINICAL SUPPORT (OUTPATIENT)
Dept: BARIATRICS/WEIGHT MGMT | Age: 64
End: 2019-05-10

## 2019-05-10 VITALS
WEIGHT: 198.9 LBS | DIASTOLIC BLOOD PRESSURE: 84 MMHG | SYSTOLIC BLOOD PRESSURE: 139 MMHG | HEART RATE: 75 BPM | BODY MASS INDEX: 30.14 KG/M2 | HEIGHT: 68 IN

## 2019-05-10 DIAGNOSIS — E66.01 SEVERE OBESITY (HCC): ICD-10-CM

## 2019-05-10 DIAGNOSIS — E88.81 INSULIN RESISTANCE: ICD-10-CM

## 2019-05-10 DIAGNOSIS — E78.2 MIXED HYPERLIPIDEMIA: Primary | ICD-10-CM

## 2019-05-10 DIAGNOSIS — E16.1 HYPERINSULINEMIA: ICD-10-CM

## 2019-05-10 NOTE — PROGRESS NOTES
Progress Note: Weekly Education Class in the [facility] Loss Program   Is there anything that you or the patient needs to let Dr. Bernabe Colvin know about? no  Over the past week, have you experienced any side-effects? no    Matheus Mix is a 59 y.o. female who is enrolled in [facility]    Visit Vitals  /84   Pulse 75   Ht 5' 8\" (1.727 m)   Wt 198 lb 14.4 oz (90.2 kg)   BMI 30.24 kg/m²     Weight Metrics 5/10/2019 5/3/2019 4/26/2019 4/26/2019 4/15/2019 4/15/2019 4/12/2019   Weight 198 lb 14.4 oz 196 lb 3.2 oz - 198 lb - 197 lb 9.6 oz 199 lb 12.8 oz   Neck Circ (inches) - - - - 13.5 - -   Waist Measure Inches 36.5 38 38 - 36 - -   BMI 30.24 kg/m2 29.83 kg/m2 - 30.11 kg/m2 - 30.04 kg/m2 30.38 kg/m2         Have you received any other medical care this week? no  If yes, where and for what? Have you had any change in your medications since your last visit? no  If yes what? Did you have any problems adhering to the program last week? no  If yes, please explain:       Eating Habits Over Last Week:  Did you take in 64 oz of non-caloric fluids?  yes     Did you consume your 4 meal replacements each day? yes     Mood: okay, tired    Physical Activity Over the Past Week:    Aerobic exercise:  Treadmill and trampoline  Resistance exercise: 7 workouts / week

## 2019-05-13 ENCOUNTER — OFFICE VISIT (OUTPATIENT)
Dept: FAMILY MEDICINE CLINIC | Age: 64
End: 2019-05-13

## 2019-05-13 VITALS
TEMPERATURE: 97.7 F | HEIGHT: 68 IN | WEIGHT: 195.3 LBS | BODY MASS INDEX: 29.6 KG/M2 | DIASTOLIC BLOOD PRESSURE: 77 MMHG | HEART RATE: 68 BPM | RESPIRATION RATE: 17 BRPM | SYSTOLIC BLOOD PRESSURE: 125 MMHG | OXYGEN SATURATION: 96 %

## 2019-05-13 DIAGNOSIS — G47.33 OSA (OBSTRUCTIVE SLEEP APNEA): ICD-10-CM

## 2019-05-13 DIAGNOSIS — E66.9 OBESITY (BMI 35.0-39.9 WITHOUT COMORBIDITY): ICD-10-CM

## 2019-05-13 DIAGNOSIS — E78.2 MIXED HYPERLIPIDEMIA: Primary | ICD-10-CM

## 2019-05-13 DIAGNOSIS — E16.1 HYPERINSULINEMIA: ICD-10-CM

## 2019-05-13 DIAGNOSIS — E88.81 INSULIN RESISTANCE: ICD-10-CM

## 2019-05-13 NOTE — PROGRESS NOTES
1. Have you been to the ER, urgent care clinic since your last visit? Hospitalized since your last visit? No 
 
2. Have you seen or consulted any other health care providers outside of the [address] [place] [address] since your last visit? Include any pap smears or colon screening. No 
 
Chief Complaint Patient presents with  Weight Management [facility] Body Weight: 195.3 Body Fat%: 39.0 Muscle Mass Weight: 29.6 Body Water Weight: 44.9 Basal Metabolic Rate: 6004 BMI: 29.70

## 2019-05-13 NOTE — PROGRESS NOTES
[facility] Progress Note:  
F/up Physician Visit CC: Weight Management Mega Moctezuma is a 59 y.o. female who is here for her  f/up physician visit for the VLCD / LCD Program. 
 
 
She is eating other foods and she admits t not making good choices She says she is getting more hungry She is exercising more, she walks 45 min a few days a week. Also does a trampolene at home She does leg lifts etc on the floor at home here and there She is getting 4 hours of sleep at [facility] and She has khushi and does not use the cpap She has lost 50 lbs Weight Metrics 5/13/2019 5/13/2019 5/10/2019 5/3/2019 4/26/2019 4/26/2019 4/15/2019 Weight - 195 lb 4.8 oz 198 lb 14.4 oz 196 lb 3.2 oz - 198 lb - Neck Circ (inches) - - - - - - 13.5 Waist Measure Inches 36 - 36.5 38 38 - 36 BMI - 29.7 kg/m2 30.24 kg/m2 29.83 kg/m2 - 30.11 kg/m2 - Outpatient Medications Marked as Taking for the 5/13/19 encounter (Office Visit) with Noé Champion MD  
Medication Sig Dispense Refill  OTHER,NON-FORMULARY, Pancreatic Enzymes 500mg 1 capsule with meals  thyroid, Pork, (ARMOUR THYROID) 60 mg tablet Take 60 mg by mouth daily.  MULTIVITAMIN (MULTIPLE VITAMINS PO) Take  by mouth. occasional    
 
 
Participation Did you attend clinic and class last week? yes Review of Systems Since your last visit, have you experienced any complications? no If yes, please list:  
 
 
Are you taking an appetite suppressant? no 
If so, is there any Chest Pain, Palpitations or Dizziness? BP Readings from Last 3 Encounters:  
05/13/19 125/77  
05/10/19 139/84  
05/03/19 129/54 SLEEP: 
 
Have you received any other medical care this week? no  If yes, where and for what? Have you discontinued or changed any medicine or dose of your medicine since your last visit with Dr Denise Villa? no  If yes, where and for what? Diet How many ounces of calorie-free liquids did you consume each day? 64 oz How many meal replacements did you take each day? 4 Did you have any problems adhering to the program?  no If yes, please explain: 
 
 
Exercise Aerobic exercise: 45 min Resistance exercise: 3-4 workouts / week Any discomfort?  no If yes, where? Objective Visit Vitals /77 Pulse 68 Temp 97.7 °F (36.5 °C) (Oral) Resp 17 Ht 5' 8\" (1.727 m) Wt 195 lb 4.8 oz (88.6 kg) SpO2 96% BMI 29.70 kg/m² No LMP recorded. Patient has had a hysterectomy. Physical Exam 
Appearance: well, Mental:A&O x 3, NAD H:NC/AT, 
EENT:   EOMI, PERRL, No scleral icterus Neck: no bruit or JVD Lung: clear, No W/R 
ABD: soft, active, nontender Ext:  no Edema Neuro: nonfocal 
Assessment / Plan Encounter Diagnoses Name Primary?  Mixed hyperlipidemia Yes  Obesity (BMI 35.0-39.9 without comorbidity)  Hyperinsulinemia  Insulin resistance  BRIT (obstructive sleep apnea) Diagnoses and all orders for this visit: 
 
1. Mixed hyperlipidemia 2. Obesity (BMI 35.0-39.9 without comorbidity) 3. Hyperinsulinemia 4. Insulin resistance 5. BRIT (obstructive sleep apnea) -     SLEEP MEDICINE REFERRAL 1. Weight management improved Progress was reviewed with patient 2. Labs Latest results reviewed with patient Lab slip given to pt for f/up HDL labs Over 20 minutes of the 30 minutes face to face time with Rangel Beatty consisted of counseling & coordinating and/or discussing treatment plans in reference to her obesity The primary encounter diagnosis was Mixed hyperlipidemia. Diagnoses of Obesity (BMI 35.0-39.9 without comorbidity), Hyperinsulinemia, Insulin resistance, and BRIT (obstructive sleep apnea) were also pertinent to this visit.

## 2019-05-24 ENCOUNTER — CLINICAL SUPPORT (OUTPATIENT)
Dept: BARIATRICS/WEIGHT MGMT | Age: 64
End: 2019-05-24

## 2019-05-24 VITALS
WEIGHT: 196.8 LBS | SYSTOLIC BLOOD PRESSURE: 135 MMHG | BODY MASS INDEX: 29.83 KG/M2 | HEIGHT: 68 IN | DIASTOLIC BLOOD PRESSURE: 78 MMHG | HEART RATE: 81 BPM

## 2019-05-24 DIAGNOSIS — E16.1 HYPERINSULINEMIA: ICD-10-CM

## 2019-05-24 DIAGNOSIS — E88.81 INSULIN RESISTANCE: ICD-10-CM

## 2019-05-24 DIAGNOSIS — E78.2 MIXED HYPERLIPIDEMIA: Primary | ICD-10-CM

## 2019-05-24 DIAGNOSIS — E66.01 SEVERE OBESITY (HCC): ICD-10-CM

## 2019-05-24 NOTE — PROGRESS NOTES
Progress Note: Weekly Education Class in the [facility]   Is there anything that you or the patient needs to let Dr. Alejo Altman know about? no  Over the past week, have you experienced any side-effects? no    Clinton Argue is a 59 y.o. female who is enrolled in [facility]    Visit Vitals  /78   Pulse 81   Ht 5' 8\" (1.727 m)   Wt 196 lb 12.8 oz (89.3 kg)   BMI 29.92 kg/m²     Weight Metrics 5/24/2019 5/13/2019 5/13/2019 5/10/2019 5/3/2019 4/26/2019 4/26/2019   Weight 196 lb 12.8 oz - 195 lb 4.8 oz 198 lb 14.4 oz 196 lb 3.2 oz - 198 lb   Neck Circ (inches) - - - - - - -   Waist Measure Inches - 36 - 36.5 38 38 -   BMI 29.92 kg/m2 - 29.7 kg/m2 30.24 kg/m2 29.83 kg/m2 - 30.11 kg/m2         Have you received any other medical care this week? no  If yes, where and for what? Have you had any change in your medications since your last visit? no  If yes what? Did you have any problems adhering to the program last week? yes  If yes, please explain: hungry      Eating Habits Over Last Week:  Did you take in 64 oz of non-caloric fluids?  yes     Did you consume your 4 meal replacements each day? yes     Mood: good    Physical Activity Over the Past Week:    Aerobic exercise: 125 min  Resistance exercise: 4 workouts / week

## 2019-05-24 NOTE — PROGRESS NOTES
Nurse note from patient's weekly VLCD / LCD / Maintenance class was reviewed. Pertinent medical concerns were:   none     BP Readings from Last 3 Encounters:   05/24/19 135/78   05/13/19 125/77   05/10/19 139/84       Weight Metrics 5/24/2019 5/13/2019 5/13/2019 5/10/2019 5/3/2019 4/26/2019 4/26/2019   Weight 196 lb 12.8 oz - 195 lb 4.8 oz 198 lb 14.4 oz 196 lb 3.2 oz - 198 lb   Neck Circ (inches) - - - - - - -   Waist Measure Inches - 36 - 36.5 38 38 -   BMI 29.92 kg/m2 - 29.7 kg/m2 30.24 kg/m2 29.83 kg/m2 - 30.11 kg/m2       Current Outpatient Medications   Medication Sig Dispense Refill    lisinopril (PRINIVIL, ZESTRIL) 10 mg tablet Take  by mouth daily.  co-enzyme Q-10 (CO Q-10) 100 mg capsule Take 100 mg by mouth daily.  aspirin delayed-release 81 mg tablet Take 81 mg by mouth two (2) times a week.  OTHER,NON-FORMULARY, Pancreatic Enzymes 500mg 1 capsule with meals      TURMERIC, BULK, Take 500 mg by mouth. occasional      OTHER,NON-FORMULARY, Gallbladder formula 1 capsule 2-3 times a day with meals      LACTOBACILLUS ACIDOPHILUS (PROBIOTIC PO) Take 1 Cap by mouth two (2) times a day.  COCONUT OIL PO Take  by mouth. occasional      thyroid, Pork, (ARMOUR THYROID) 60 mg tablet Take 60 mg by mouth daily.  MULTIVITAMIN (MULTIPLE VITAMINS PO) Take  by mouth. occasional      CHOLECALCIFEROL, VITAMIN D3, (VITAMIN D3 PO) Take 5,000 Int'l Units by mouth daily.

## 2019-05-28 NOTE — PROGRESS NOTES
Nurse note from patient's weekly VLCD / LCD / Maintenance class was reviewed. Pertinent medical concerns were:   none     BP Readings from Last 3 Encounters:   05/24/19 135/78   05/13/19 125/77   05/10/19 139/84       Weight Metrics 5/24/2019 5/13/2019 5/13/2019 5/10/2019 5/3/2019 4/26/2019 4/26/2019   Weight 196 lb 12.8 oz - 195 lb 4.8 oz 198 lb 14.4 oz 196 lb 3.2 oz - 198 lb   Neck Circ (inches) - - - - - - -   Waist Measure Inches - 36 - 36.5 38 38 -   BMI 29.92 kg/m2 - 29.7 kg/m2 30.24 kg/m2 29.83 kg/m2 - 30.11 kg/m2       Current Outpatient Medications   Medication Sig Dispense Refill    OTHER,NON-FORMULARY, Pancreatic Enzymes 500mg 1 capsule with meals      thyroid, Pork, (ARMOUR THYROID) 60 mg tablet Take 60 mg by mouth daily.  MULTIVITAMIN (MULTIPLE VITAMINS PO) Take  by mouth. occasional      lisinopril (PRINIVIL, ZESTRIL) 10 mg tablet Take  by mouth daily.  co-enzyme Q-10 (CO Q-10) 100 mg capsule Take 100 mg by mouth daily.  aspirin delayed-release 81 mg tablet Take 81 mg by mouth two (2) times a week.  TURMERIC, BULK, Take 500 mg by mouth. occasional      OTHER,NON-FORMULARY, Gallbladder formula 1 capsule 2-3 times a day with meals      LACTOBACILLUS ACIDOPHILUS (PROBIOTIC PO) Take 1 Cap by mouth two (2) times a day.  COCONUT OIL PO Take  by mouth. occasional      CHOLECALCIFEROL, VITAMIN D3, (VITAMIN D3 PO) Take 5,000 Int'l Units by mouth daily.

## 2019-05-31 ENCOUNTER — CLINICAL SUPPORT (OUTPATIENT)
Dept: BARIATRICS/WEIGHT MGMT | Age: 64
End: 2019-05-31

## 2019-05-31 VITALS
SYSTOLIC BLOOD PRESSURE: 114 MMHG | WEIGHT: 194.1 LBS | BODY MASS INDEX: 29.42 KG/M2 | HEIGHT: 68 IN | DIASTOLIC BLOOD PRESSURE: 71 MMHG

## 2019-05-31 DIAGNOSIS — E66.01 SEVERE OBESITY (HCC): ICD-10-CM

## 2019-05-31 DIAGNOSIS — E16.1 HYPERINSULINEMIA: ICD-10-CM

## 2019-05-31 DIAGNOSIS — E78.2 MIXED HYPERLIPIDEMIA: Primary | ICD-10-CM

## 2019-05-31 DIAGNOSIS — E88.81 INSULIN RESISTANCE: ICD-10-CM

## 2019-05-31 NOTE — PROGRESS NOTES
Progress Note: Weekly Education Class in the [facility]   Is there anything that you or the patient needs to let Dr. Raulito Shepherd know about? no  Over the past week, have you experienced any side-effects?no    Kyle Scanlon is a 59 y.o. female who is enrolled in [facility]    Visit Vitals  /71   Ht 5' 8\" (1.727 m)   Wt 194 lb 1.6 oz (88 kg)   BMI 29.51 kg/m²     Weight Metrics 5/31/2019 5/24/2019 5/13/2019 5/13/2019 5/10/2019 5/3/2019 4/26/2019   Weight 194 lb 1.6 oz 196 lb 12.8 oz - 195 lb 4.8 oz 198 lb 14.4 oz 196 lb 3.2 oz -   Neck Circ (inches) - - - - - - -   Waist Measure Inches - - 36 - 36.5 38 38   BMI 29.51 kg/m2 29.92 kg/m2 - 29.7 kg/m2 30.24 kg/m2 29.83 kg/m2 -         Have you received any other medical care this week? no If yes, where and for what? Have you had any change in your medications since your last visit? no  If yes what? Did you have any problems adhering to the program last week? no  If yes, please explain:       Eating Habits Over Last Week:  Did you take in 64 oz of non-caloric fluids?  yes     Did you consume your 4 meal replacements each day? yes     Mood: n/a    Physical Activity Over the Past Week:    Aerobic exercise: 120 min  Resistance exercise: 3 workouts / week

## 2019-06-05 NOTE — PROGRESS NOTES
Nurse note from patient's weekly VLCD / LCD / Maintenance class was reviewed. Pertinent medical concerns were:   none     BP Readings from Last 3 Encounters:   05/31/19 114/71   05/24/19 135/78   05/13/19 125/77       Weight Metrics 5/31/2019 5/24/2019 5/13/2019 5/13/2019 5/10/2019 5/3/2019 4/26/2019   Weight 194 lb 1.6 oz 196 lb 12.8 oz - 195 lb 4.8 oz 198 lb 14.4 oz 196 lb 3.2 oz -   Neck Circ (inches) - - - - - - -   Waist Measure Inches - - 36 - 36.5 38 38   BMI 29.51 kg/m2 29.92 kg/m2 - 29.7 kg/m2 30.24 kg/m2 29.83 kg/m2 -       Current Outpatient Medications   Medication Sig Dispense Refill    OTHER,NON-FORMULARY, Pancreatic Enzymes 500mg 1 capsule with meals      thyroid, Pork, (ARMOUR THYROID) 60 mg tablet Take 60 mg by mouth daily.  MULTIVITAMIN (MULTIPLE VITAMINS PO) Take  by mouth. occasional      lisinopril (PRINIVIL, ZESTRIL) 10 mg tablet Take  by mouth daily.  co-enzyme Q-10 (CO Q-10) 100 mg capsule Take 100 mg by mouth daily.  aspirin delayed-release 81 mg tablet Take 81 mg by mouth two (2) times a week.  TURMERIC, BULK, Take 500 mg by mouth. occasional      OTHER,NON-FORMULARY, Gallbladder formula 1 capsule 2-3 times a day with meals      LACTOBACILLUS ACIDOPHILUS (PROBIOTIC PO) Take 1 Cap by mouth two (2) times a day.  COCONUT OIL PO Take  by mouth. occasional      CHOLECALCIFEROL, VITAMIN D3, (VITAMIN D3 PO) Take 5,000 Int'l Units by mouth daily.

## 2019-06-07 ENCOUNTER — CLINICAL SUPPORT (OUTPATIENT)
Dept: BARIATRICS/WEIGHT MGMT | Age: 64
End: 2019-06-07

## 2019-06-07 VITALS — WEIGHT: 192.9 LBS | BODY MASS INDEX: 29.24 KG/M2 | HEIGHT: 68 IN

## 2019-06-07 DIAGNOSIS — E66.01 SEVERE OBESITY (HCC): ICD-10-CM

## 2019-06-07 DIAGNOSIS — E88.81 INSULIN RESISTANCE: ICD-10-CM

## 2019-06-07 DIAGNOSIS — E78.2 MIXED HYPERLIPIDEMIA: Primary | ICD-10-CM

## 2019-06-07 DIAGNOSIS — E16.1 HYPERINSULINEMIA: ICD-10-CM

## 2019-06-07 NOTE — PROGRESS NOTES
Progress Note: Weekly Education Class in the [facility]   Is there anything that you or the patient needs to let Dr. Melony Navarro know about? no  Over the past week, have you experienced any side-effects? no    Fernando Mederos is a 59 y.o. female who is enrolled in [facility]    [facility] (1.727 [facility]   Wt 192 lb 14.4 oz (87.5 kg)   BMI 29.33 kg/m²     Weight Metrics 6/7/2019 5/31/2019 5/24/2019 5/13/2019 5/13/2019 5/10/2019 5/3/2019   Weight 192 lb 14.4 oz 194 lb 1.6 oz 196 lb 12.8 oz - 195 lb 4.8 oz 198 lb 14.4 oz 196 lb 3.2 oz   Neck Circ (inches) - - - - - - -   Waist Measure Inches - - - 36 - 36.5 38   BMI 29.33 kg/m2 29.51 kg/m2 29.92 kg/m2 - 29.7 kg/m2 30.24 kg/m2 29.83 kg/m2         Have you received any other medical care this week? no  If yes, where and for what? Have you had any change in your medications since your last visit? no  If yes what? Did you have any problems adhering to the program last week? no  If yes, please explain:       Eating Habits Over Last Week:  Did you take in 64 oz of non-caloric fluids?  yes     Did you consume your 4 meal replacements each day? yes     Mood: good    Physical Activity Over the Past Week:    Aerobic exercise: 90 min  Resistance exercise: 3 workouts / week

## 2019-06-10 ENCOUNTER — OFFICE VISIT (OUTPATIENT)
Dept: FAMILY MEDICINE CLINIC | Age: 64
End: 2019-06-10

## 2019-06-10 VITALS
TEMPERATURE: 97.9 F | BODY MASS INDEX: 28.84 KG/M2 | RESPIRATION RATE: 18 BRPM | WEIGHT: 190.3 LBS | HEIGHT: 68 IN | OXYGEN SATURATION: 97 % | SYSTOLIC BLOOD PRESSURE: 122 MMHG | HEART RATE: 66 BPM | DIASTOLIC BLOOD PRESSURE: 76 MMHG

## 2019-06-10 DIAGNOSIS — E88.81 INSULIN RESISTANCE: ICD-10-CM

## 2019-06-10 DIAGNOSIS — E66.9 OBESITY (BMI 35.0-39.9 WITHOUT COMORBIDITY): ICD-10-CM

## 2019-06-10 DIAGNOSIS — E03.9 ACQUIRED HYPOTHYROIDISM: ICD-10-CM

## 2019-06-10 DIAGNOSIS — G47.33 OSA (OBSTRUCTIVE SLEEP APNEA): ICD-10-CM

## 2019-06-10 DIAGNOSIS — E16.1 HYPERINSULINEMIA: ICD-10-CM

## 2019-06-10 DIAGNOSIS — E78.2 MIXED HYPERLIPIDEMIA: Primary | ICD-10-CM

## 2019-06-10 DIAGNOSIS — R63.4 RAPID WEIGHT LOSS: ICD-10-CM

## 2019-06-10 NOTE — PROGRESS NOTES
1. Have you been to the ER, urgent care clinic since your last visit? Hospitalized since your last visit? No    2. Have you seen or consulted any other health care providers outside of the [address] [place] [address] since your last visit? Include any pap smears or colon screening.  No    Chief Complaint   Patient presents with    Weight Management     MSWL     Body Weight: 190.3  Body Fat%: 37.4  Muscle Mass Weight: 28.8  Body Water Weight: 96.5  Basal Metabolic Rate: 3540  BMI: 28.94

## 2019-06-10 NOTE — PROGRESS NOTES
[facility] Progress Note:   F/up Physician Visit    CC: Weight Management      Juan Francisco Gomez is a 59 y.o. female who is here for her  f/up physician visit for the VLCD / LCD Program.    Weight Metrics 6/10/2019 6/10/2019 6/7/2019 5/31/2019 5/24/2019 5/13/2019 5/13/2019   Weight - 190 lb 4.8 oz 192 lb 14.4 oz 194 lb 1.6 oz 196 lb 12.8 oz - 195 lb 4.8 oz   Neck Circ (inches) 14 - - - - - -   Waist Measure Inches 35 - - - - 36 -   BMI - 28.94 kg/m2 29.33 kg/m2 29.51 kg/m2 29.92 kg/m2 - 29.7 kg/m2         Outpatient Medications Marked as Taking for the 6/10/19 encounter (Office Visit) with Daniel Saldana MD   Medication Sig Dispense Refill    OTHER,NON-FORMULARY, Pancreatic Enzymes 500mg 1 capsule with meals      thyroid, Pork, (ARMOUR THYROID) 60 mg tablet Take 60 mg by mouth daily.  MULTIVITAMIN (MULTIPLE VITAMINS PO) Take  by mouth. occasional           Participation   Did you attend clinic and class last week? no    Review of Systems  Since your last visit, have you experienced any complications? no  If yes, please list:       Are you taking an appetite suppressant? no  If so, is there any Chest Pain, Palpitations or Dizziness? BP Readings from Last 3 Encounters:   06/10/19 122/76   05/31/19 114/71   05/24/19 135/78       SLEEP:    Have you received any other medical care this week? no  If yes, where and for what? Have you discontinued or changed any medicine or dose of your medicine since your last visit with Dr Oseas James? no  If yes, where and for what? Diet  How many ounces of calorie-free liquids did you consume each day? 64  Or  More oz    How many meal replacements did you take each day? 4    Did you have any problems adhering to the program?  no If yes, please explain:      Exercise  Aerobic exercise: trampoline 20-30 min and  walking 45  min  Resistance exercise: *3-5 workouts / week  Any discomfort?  no     If yes, where?       Objective  Visit Vitals  BP 122/76   Pulse 66   Temp 97.9 °F (36.6 °C) (Oral)   Resp 18   Ht 5' 8\" (1.727 m)   Wt 190 lb 4.8 oz (86.3 kg)   SpO2 97%   BMI 28.94 kg/m²     No LMP recorded. Patient has had a hysterectomy. Physical Exam  Appearance: well,   Mental:A&O x 3, NAD  H:NC/AT,  EENT:   EOMI, PERRL, No scleral icterus  Neck: no bruit or JVD  Lung: clear, No W/R  ABD: soft, active, nontender  Ext:  no Edema  Neuro: nonfocal  Assessment / Plan    Encounter Diagnoses   Name Primary?  Mixed hyperlipidemia Yes    Obesity (BMI 35.0-39.9 without comorbidity)     BRIT (obstructive sleep apnea)     Insulin resistance     Hyperinsulinemia     Acquired hypothyroidism     Rapid weight loss      Diagnoses and all orders for this visit:    1. Mixed hyperlipidemia  Better, very close now to normal  2. Obesity (BMI 35.0-39.9 without comorbidity)  -     METABOLIC PANEL, COMPREHENSIVE; Future    3. BRIT (obstructive sleep apnea)  Not using cpap, I encouraged to restart and she agreed  4. Insulin resistance  The insulin is normal, les resistant   5. Hyperinsulinemia  See above  6. Acquired hypothyroidism  -     TSH 3RD GENERATION; Future    7. Rapid weight loss  -     METABOLIC PANEL, COMPREHENSIVE; Future  -     TSH 3RD GENERATION; Future      1. Weight management improved   Progress was reviewed with patient    2. Labs    Latest results reviewed with patient   Lab slip given to pt for f/up HDL labs      Over 20 minutes of the 30 minutes face to face time with Mccann Door consisted of counseling & coordinating and/or discussing treatment plans in reference to her obesity The primary encounter diagnosis was Mixed hyperlipidemia. Diagnoses of Obesity (BMI 35.0-39.9 without comorbidity), BRIT (obstructive sleep apnea), Insulin resistance, Hyperinsulinemia, Acquired hypothyroidism, and Rapid weight loss were also pertinent to this visit.

## 2019-06-15 NOTE — PROGRESS NOTES
Nurse note from patient's weekly VLCD / LCD / Maintenance class was reviewed. Pertinent medical concerns were:   none     BP Readings from Last 3 Encounters:   06/10/19 122/76   05/31/19 114/71   05/24/19 135/78       Weight Metrics 6/10/2019 6/10/2019 6/7/2019 5/31/2019 5/24/2019 5/13/2019 5/13/2019   Weight - 190 lb 4.8 oz 192 lb 14.4 oz 194 lb 1.6 oz 196 lb 12.8 oz - 195 lb 4.8 oz   Neck Circ (inches) 14 - - - - - -   Waist Measure Inches 35 - - - - 36 -   BMI - 28.94 kg/m2 29.33 kg/m2 29.51 kg/m2 29.92 kg/m2 - 29.7 kg/m2       Current Outpatient Medications   Medication Sig Dispense Refill    OTHER,NON-FORMULARY, Pancreatic Enzymes 500mg 1 capsule with meals      thyroid, Pork, (ARMOUR THYROID) 60 mg tablet Take 60 mg by mouth daily.  MULTIVITAMIN (MULTIPLE VITAMINS PO) Take  by mouth. occasional      lisinopril (PRINIVIL, ZESTRIL) 10 mg tablet Take  by mouth daily.  co-enzyme Q-10 (CO Q-10) 100 mg capsule Take 100 mg by mouth daily.  aspirin delayed-release 81 mg tablet Take 81 mg by mouth two (2) times a week.  TURMERIC, BULK, Take 500 mg by mouth. occasional      OTHER,NON-FORMULARY, Gallbladder formula 1 capsule 2-3 times a day with meals      LACTOBACILLUS ACIDOPHILUS (PROBIOTIC PO) Take 1 Cap by mouth two (2) times a day.  COCONUT OIL PO Take  by mouth. occasional      CHOLECALCIFEROL, VITAMIN D3, (VITAMIN D3 PO) Take 5,000 Int'l Units by mouth daily.

## 2019-06-21 ENCOUNTER — CLINICAL SUPPORT (OUTPATIENT)
Dept: BARIATRICS/WEIGHT MGMT | Age: 64
End: 2019-06-21

## 2019-06-21 VITALS
WEIGHT: 192.4 LBS | HEIGHT: 68 IN | DIASTOLIC BLOOD PRESSURE: 71 MMHG | SYSTOLIC BLOOD PRESSURE: 112 MMHG | BODY MASS INDEX: 29.16 KG/M2 | HEART RATE: 74 BPM

## 2019-06-21 DIAGNOSIS — E88.81 INSULIN RESISTANCE: ICD-10-CM

## 2019-06-21 DIAGNOSIS — E78.2 MIXED HYPERLIPIDEMIA: Primary | ICD-10-CM

## 2019-06-21 DIAGNOSIS — E66.01 SEVERE OBESITY (HCC): ICD-10-CM

## 2019-06-21 DIAGNOSIS — E16.1 HYPERINSULINEMIA: ICD-10-CM

## 2019-06-21 NOTE — PROGRESS NOTES
Progress Note: Weekly Education Class in the [facility]   Is there anything that you or the patient needs to let Dr. Raulito Shepherd know about? no  Over the past week, have you experienced any side-effects? no    Kyle Scanlon is a 59 y.o. female who is enrolled in [facility]    Visit Vitals  /71   Pulse 74   Ht 5' 8\" (1.727 m)   Wt 192 lb 6.4 oz (87.3 kg)   BMI 29.25 kg/m²     Weight Metrics 6/21/2019 6/10/2019 6/10/2019 6/7/2019 5/31/2019 5/24/2019 5/13/2019   Weight 192 lb 6.4 oz - 190 lb 4.8 oz 192 lb 14.4 oz 194 lb 1.6 oz 196 lb 12.8 oz -   Neck Circ (inches) - 14 - - - - -   Waist Measure Inches - 35 - - - - 36   BMI 29.25 kg/m2 - 28.94 kg/m2 29.33 kg/m2 29.51 kg/m2 29.92 kg/m2 -         Have you received any other medical care this week? no  If yes, where and for what? Have you had any change in your medications since your last visit? no  If yes what? Did you have any problems adhering to the program last week? yes  If yes, please explain: cravings for food evenings. Not everyday-several times a week. Eating Habits Over Last Week:  Did you take in 64 oz of non-caloric fluids? no     Did you consume your 4 meal replacements each day? no     Mood: content,tired.     Physical Activity Over the Past Week:    Aerobic exercise: 0 min  Resistance exercise: 0 workouts / week

## 2019-06-27 NOTE — PROGRESS NOTES
Nurse note from patient's weekly VLCD / LCD / Maintenance class was reviewed. Pertinent medical concerns were:   none     BP Readings from Last 3 Encounters:   06/21/19 112/71   06/10/19 122/76   05/31/19 114/71       Weight Metrics 6/21/2019 6/10/2019 6/10/2019 6/7/2019 5/31/2019 5/24/2019 5/13/2019   Weight 192 lb 6.4 oz - 190 lb 4.8 oz 192 lb 14.4 oz 194 lb 1.6 oz 196 lb 12.8 oz -   Neck Circ (inches) - 14 - - - - -   Waist Measure Inches - 35 - - - - 36   BMI 29.25 kg/m2 - 28.94 kg/m2 29.33 kg/m2 29.51 kg/m2 29.92 kg/m2 -       Current Outpatient Medications   Medication Sig Dispense Refill    OTHER,NON-FORMULARY, Pancreatic Enzymes 500mg 1 capsule with meals      thyroid, Pork, (ARMOUR THYROID) 60 mg tablet Take 60 mg by mouth daily.  MULTIVITAMIN (MULTIPLE VITAMINS PO) Take  by mouth.  occasional

## 2019-06-28 ENCOUNTER — CLINICAL SUPPORT (OUTPATIENT)
Dept: BARIATRICS/WEIGHT MGMT | Age: 64
End: 2019-06-28

## 2019-06-28 VITALS
SYSTOLIC BLOOD PRESSURE: 125 MMHG | DIASTOLIC BLOOD PRESSURE: 69 MMHG | HEIGHT: 68 IN | WEIGHT: 190.2 LBS | BODY MASS INDEX: 28.82 KG/M2

## 2019-06-28 DIAGNOSIS — E78.2 MIXED HYPERLIPIDEMIA: Primary | ICD-10-CM

## 2019-06-28 DIAGNOSIS — E66.01 SEVERE OBESITY (HCC): ICD-10-CM

## 2019-06-28 DIAGNOSIS — E16.1 HYPERINSULINEMIA: ICD-10-CM

## 2019-06-28 DIAGNOSIS — E88.81 INSULIN RESISTANCE: ICD-10-CM

## 2019-06-28 NOTE — PROGRESS NOTES
Progress Note: Weekly Education Class in the [facility]   Is there anything that you or the patient needs to let Dr. Clara Boswell know about? no  Over the past week, have you experienced any side-effects? no    Aurora Mello is a 59 y.o. female who is enrolled in [facility]    Visit Vitals  /69   Ht 5' 8\" (1.727 m)   Wt 190 lb 3.2 oz (86.3 kg)   BMI 28.92 kg/m²     Weight Metrics 6/28/2019 6/21/2019 6/10/2019 6/10/2019 6/7/2019 5/31/2019 5/24/2019   Weight 190 lb 3.2 oz 192 lb 6.4 oz - 190 lb 4.8 oz 192 lb 14.4 oz 194 lb 1.6 oz 196 lb 12.8 oz   Neck Circ (inches) - - 14 - - - -   Waist Measure Inches - - 35 - - - -   BMI 28.92 kg/m2 29.25 kg/m2 - 28.94 kg/m2 29.33 kg/m2 29.51 kg/m2 29.92 kg/m2         Have you received any other medical care this week? no  If yes, where and for what? Have you had any change in your medications since your last visit? no  If yes what? Did you have any problems adhering to the program last week? no  If yes, please explain:       Eating Habits Over Last Week:  Did you take in 64 oz of non-caloric fluids?  yes     Did you consume your 4 meal replacements each day? yes    Mood: tired    Physical Activity Over the Past Week:    Aerobic exercise: 30 min  Resistance exercise: 1 workouts / week

## 2019-07-02 NOTE — PROGRESS NOTES
Nurse note from patient's weekly VLCD / LCD / Maintenance class was reviewed. Pertinent medical concerns were:   none     BP Readings from Last 3 Encounters:   06/28/19 125/69   06/21/19 112/71   06/10/19 122/76       Weight Metrics 6/28/2019 6/21/2019 6/10/2019 6/10/2019 6/7/2019 5/31/2019 5/24/2019   Weight 190 lb 3.2 oz 192 lb 6.4 oz - 190 lb 4.8 oz 192 lb 14.4 oz 194 lb 1.6 oz 196 lb 12.8 oz   Neck Circ (inches) - - 14 - - - -   Waist Measure Inches - - 35 - - - -   BMI 28.92 kg/m2 29.25 kg/m2 - 28.94 kg/m2 29.33 kg/m2 29.51 kg/m2 29.92 kg/m2       Current Outpatient Medications   Medication Sig Dispense Refill    OTHER,NON-FORMULARY, Pancreatic Enzymes 500mg 1 capsule with meals      thyroid, Pork, (ARMOUR THYROID) 60 mg tablet Take 60 mg by mouth daily.  MULTIVITAMIN (MULTIPLE VITAMINS PO) Take  by mouth.  occasional

## 2019-07-18 DIAGNOSIS — R63.4 RAPID WEIGHT LOSS: ICD-10-CM

## 2019-07-18 DIAGNOSIS — E66.9 OBESITY (BMI 35.0-39.9 WITHOUT COMORBIDITY): ICD-10-CM

## 2019-07-18 DIAGNOSIS — E03.9 ACQUIRED HYPOTHYROIDISM: Primary | ICD-10-CM

## 2019-07-19 ENCOUNTER — CLINICAL SUPPORT (OUTPATIENT)
Dept: BARIATRICS/WEIGHT MGMT | Age: 64
End: 2019-07-19

## 2019-07-19 DIAGNOSIS — E88.81 INSULIN RESISTANCE: ICD-10-CM

## 2019-07-19 DIAGNOSIS — E16.1 HYPERINSULINEMIA: ICD-10-CM

## 2019-07-19 DIAGNOSIS — E78.2 MIXED HYPERLIPIDEMIA: Primary | ICD-10-CM

## 2019-07-19 DIAGNOSIS — E66.01 SEVERE OBESITY (HCC): ICD-10-CM

## 2019-07-19 LAB
ALBUMIN SERPL-MCNC: 4.5 G/DL (ref 3.6–4.8)
ALBUMIN/GLOB SERPL: 2 {RATIO} (ref 1.2–2.2)
ALP SERPL-CCNC: 79 IU/L (ref 39–117)
ALT SERPL-CCNC: 15 IU/L (ref 0–32)
AST SERPL-CCNC: 14 IU/L (ref 0–40)
BILIRUB SERPL-MCNC: 0.3 MG/DL (ref 0–1.2)
BUN SERPL-MCNC: 12 MG/DL (ref 8–27)
BUN/CREAT SERPL: 13 (ref 12–28)
CALCIUM SERPL-MCNC: 9.7 MG/DL (ref 8.7–10.3)
CHLORIDE SERPL-SCNC: 106 MMOL/L (ref 96–106)
CO2 SERPL-SCNC: 24 MMOL/L (ref 20–29)
CREAT SERPL-MCNC: 0.92 MG/DL (ref 0.57–1)
GLOBULIN SER CALC-MCNC: 2.3 G/DL (ref 1.5–4.5)
GLUCOSE SERPL-MCNC: 92 MG/DL (ref 65–99)
POTASSIUM SERPL-SCNC: 4.3 MMOL/L (ref 3.5–5.2)
PROT SERPL-MCNC: 6.8 G/DL (ref 6–8.5)
SODIUM SERPL-SCNC: 143 MMOL/L (ref 134–144)
TSH SERPL DL<=0.005 MIU/L-ACNC: 2.12 UIU/ML (ref 0.45–4.5)

## 2019-07-22 ENCOUNTER — OFFICE VISIT (OUTPATIENT)
Dept: FAMILY MEDICINE CLINIC | Age: 64
End: 2019-07-22

## 2019-07-22 VITALS
SYSTOLIC BLOOD PRESSURE: 101 MMHG | RESPIRATION RATE: 16 BRPM | OXYGEN SATURATION: 98 % | DIASTOLIC BLOOD PRESSURE: 68 MMHG | TEMPERATURE: 97.8 F | WEIGHT: 190 LBS | HEART RATE: 71 BPM | HEIGHT: 68 IN | BODY MASS INDEX: 28.79 KG/M2

## 2019-07-22 VITALS
DIASTOLIC BLOOD PRESSURE: 76 MMHG | BODY MASS INDEX: 28.98 KG/M2 | HEART RATE: 77 BPM | SYSTOLIC BLOOD PRESSURE: 134 MMHG | HEIGHT: 68 IN | WEIGHT: 191.2 LBS

## 2019-07-22 DIAGNOSIS — G47.33 OSA (OBSTRUCTIVE SLEEP APNEA): ICD-10-CM

## 2019-07-22 DIAGNOSIS — R63.4 RAPID WEIGHT LOSS: ICD-10-CM

## 2019-07-22 DIAGNOSIS — E03.9 ACQUIRED HYPOTHYROIDISM: ICD-10-CM

## 2019-07-22 DIAGNOSIS — E78.2 MIXED HYPERLIPIDEMIA: Primary | ICD-10-CM

## 2019-07-22 DIAGNOSIS — E66.9 OBESITY (BMI 35.0-39.9 WITHOUT COMORBIDITY): ICD-10-CM

## 2019-07-22 DIAGNOSIS — E88.81 INSULIN RESISTANCE: ICD-10-CM

## 2019-07-22 NOTE — PROGRESS NOTES
1. Have you been to the ER, urgent care clinic since your last visit? Hospitalized since your last visit? No    2. Have you seen or consulted any other health care providers outside of the [address] [place] [address] since your last visit? Include any pap smears or colon screening.  No     Chief Complaint   Patient presents with    Weight Management     MSWL     Body Weight: 190.0  Body Fat%: 37.6  Muscle Mass Weight: 28.8  Body Water Weight: 22.1  Basal Metabolic Rate: 3899  BMI: 28.89

## 2019-07-22 NOTE — PROGRESS NOTES
[facility] Progress Note:   F/up Physician Visit    CC: Weight Management      Vara Duverney is a 59 y.o. female who is here for her  f/up physician visit for the VLCD / LCD Program.    She has had a very stressful week  She has been sticking with the products during the day and in the evening she has trouble craving other foods. As she cooks for her  she is having a harder time avoiding sweets  She has not been weighed since June 28th  She has been away on a trip so she has missed a few checkins  She has maintained the weight ,         Weight Metrics 7/22/2019 7/22/2019 7/19/2019 6/28/2019 6/21/2019 6/10/2019 6/10/2019   Weight - 190 lb 191 lb 3.2 oz 190 lb 3.2 oz 192 lb 6.4 oz - 190 lb 4.8 oz   Neck Circ (inches) 13 - - - - 14 -   Waist Measure Inches 37.5 - - - - 35 -   BMI - 28.89 kg/m2 29.07 kg/m2 28.92 kg/m2 29.25 kg/m2 - 28.94 kg/m2         Outpatient Medications Marked as Taking for the 7/22/19 encounter (Office Visit) with Tee Trujillo MD   Medication Sig Dispense Refill    OTHER,NON-FORMULARY, Pancreatic Enzymes 500mg 1 capsule with meals      thyroid, Pork, (ARMOUR THYROID) 60 mg tablet Take 60 mg by mouth daily.  MULTIVITAMIN (MULTIPLE VITAMINS PO) Take  by mouth. occasional           Participation   Did you attend clinic and class last week? no    Review of Systems  Since your last visit, have you experienced any complications? no  If yes, please list:       Are you taking an appetite suppressant? no  If so, is there any Chest Pain, Palpitations or Dizziness? BP Readings from Last 3 Encounters:   07/22/19 101/68   07/22/19 134/76   06/28/19 125/69       SLEEP:    Have you received any other medical care this week? no  If yes, where and for what? Have you discontinued or changed any medicine or dose of your medicine since your last visit with Dr Katie Vargas? no  If yes, where and for what?          Diet  How many ounces of calorie-free liquids did you consume each day?  80 oz    How many meal replacements did you take each day? 4    Did you have any problems adhering to the program?  no If yes, please explain:      Exercise  Aerobic exercise: 0 min  Resistance exercise: 0 workouts / week  Any discomfort?  no     If yes, where? Objective  Visit Vitals  /68   Pulse 71   Temp 97.8 °F (36.6 °C) (Oral)   Resp 16   Ht 5' 8\" (1.727 m)   Wt 190 lb (86.2 kg)   SpO2 98%   BMI 28.89 kg/m²     No LMP recorded. Patient has had a hysterectomy. Physical Exam  Appearance: well,   Mental:A&O x 3, NAD  H:NC/AT,  EENT:   EOMI, PERRL, No scleral icterus  Neck: no bruit or JVD  Lung: clear, No W/R  ABD: soft, active, nontender  Ext:  no Edema  Neuro: nonfocal  Assessment / Plan    Encounter Diagnoses   Name Primary?  Mixed hyperlipidemia Yes    Obesity (BMI 35.0-39.9 without comorbidity)     BRIT (obstructive sleep apnea)     Insulin resistance     Acquired hypothyroidism     Rapid weight loss      Diagnoses and all orders for this visit:    1. Mixed hyperlipidemia  Cont to monitor  2. Obesity (BMI 35.0-39.9 without comorbidity)  Has been able to maintain although has done a lot of travelling which I congratulated her for  Get back on track now, she is very close to goal  3. BRIT (obstructive sleep apnea)    4. Insulin resistance  Cont to monitor  5. Acquired hypothyroidism  Cont to  monitor  6. Rapid weight loss  -     METABOLIC PANEL, COMPREHENSIVE  -     URIC ACID  -     MAGNESIUM      1.  Weight management improved   Progress was reviewed with patient    2. Labs    Latest results reviewed with patient   Lab slip given to pt for f/up  labs      Over 20 minutes of the 30 minutes face to face time with Porfirio Gar consisted of counseling & coordinating and/or discussing treatment plans in reference to her obesity The primary encounter diagnosis was Mixed hyperlipidemia.  Diagnoses of Obesity (BMI 35.0-39.9 without comorbidity), BRIT (obstructive sleep apnea), Insulin resistance, Acquired hypothyroidism, and Rapid weight loss were also pertinent to this visit.

## 2019-07-22 NOTE — PROGRESS NOTES
Progress Note: Weekly Education Class in the [facility]   Is there anything that you or the patient needs to let Dr. Pisano Fearing know about? no  Over the past week, have you experienced any side-effects? no    Danita Medellin is a 59 y.o. female who is enrolled in [facility]    Visit Vitals  /76   Pulse 77   Ht 5' 8\" (1.727 m)   Wt 191 lb 3.2 oz (86.7 kg)   BMI 29.07 kg/m²     Weight Metrics 7/22/2019 7/22/2019 7/19/2019 6/28/2019 6/21/2019 6/10/2019 6/10/2019   Weight - 190 lb 191 lb 3.2 oz 190 lb 3.2 oz 192 lb 6.4 oz - 190 lb 4.8 oz   Neck Circ (inches) 13 - - - - 14 -   Waist Measure Inches 37.5 - - - - 35 -   BMI - 28.89 kg/m2 29.07 kg/m2 28.92 kg/m2 29.25 kg/m2 - 28.94 kg/m2         Have you received any other medical care this week? no  If yes, where and for what? Have you had any change in your medications since your last visit? no  If yes what? Did you have any problems adhering to the program last week? yes  If yes, please explain: hungry in the evenings, but im fine in the morning and afternoon. Eating Habits Over Last Week:  Did you take in 64 oz of non-caloric fluids?  no     Did you consume your 4 meal replacements each day? no     Mood:     Physical Activity Over the Past Week:    Aerobic exercise: 0 min  Resistance exercise: 0 workouts / week

## 2019-07-27 NOTE — PROGRESS NOTES
Nurse note from patient's weekly VLCD / LCD / Maintenance class was reviewed. Pertinent medical concerns were:   none     BP Readings from Last 3 Encounters:   07/22/19 101/68   07/22/19 134/76   06/28/19 125/69       Weight Metrics 7/22/2019 7/22/2019 7/19/2019 6/28/2019 6/21/2019 6/10/2019 6/10/2019   Weight - 190 lb 191 lb 3.2 oz 190 lb 3.2 oz 192 lb 6.4 oz - 190 lb 4.8 oz   Neck Circ (inches) 13 - - - - 14 -   Waist Measure Inches 37.5 - - - - 35 -   BMI - 28.89 kg/m2 29.07 kg/m2 28.92 kg/m2 29.25 kg/m2 - 28.94 kg/m2       Current Outpatient Medications   Medication Sig Dispense Refill    OTHER,NON-FORMULARY, Pancreatic Enzymes 500mg 1 capsule with meals      thyroid, Pork, (ARMOUR THYROID) 60 mg tablet Take 60 mg by mouth daily.  MULTIVITAMIN (MULTIPLE VITAMINS PO) Take  by mouth.  occasional

## 2019-08-02 ENCOUNTER — CLINICAL SUPPORT (OUTPATIENT)
Dept: BARIATRICS/WEIGHT MGMT | Age: 64
End: 2019-08-02

## 2019-08-02 VITALS
HEIGHT: 68 IN | HEART RATE: 71 BPM | SYSTOLIC BLOOD PRESSURE: 130 MMHG | BODY MASS INDEX: 29.08 KG/M2 | DIASTOLIC BLOOD PRESSURE: 73 MMHG | WEIGHT: 191.9 LBS

## 2019-08-02 DIAGNOSIS — E78.2 MIXED HYPERLIPIDEMIA: Primary | ICD-10-CM

## 2019-08-02 DIAGNOSIS — E66.01 SEVERE OBESITY (HCC): ICD-10-CM

## 2019-08-02 DIAGNOSIS — E16.1 HYPERINSULINEMIA: ICD-10-CM

## 2019-08-02 DIAGNOSIS — E88.81 INSULIN RESISTANCE: ICD-10-CM

## 2019-08-02 NOTE — PROGRESS NOTES
Progress Note: Weekly Education Class in the [facility] Weight Loss Program   Is there anything that you or the patient needs to let Dr. Fiorella Colby know about? no  Over the past week, have you experienced any side-effects? Yes, lightheadedness, fatigue    Jacques Iyer is a 59 y.o. female who is enrolled in [facility]    Visit Vitals  /73   Pulse 71   Ht 5' 8\" (1.727 m)   Wt 191 lb 14.4 oz (87 kg)   BMI 29.18 kg/m²     Weight Metrics 8/2/2019 7/22/2019 7/22/2019 7/19/2019 6/28/2019 6/21/2019 6/10/2019   Weight 191 lb 14.4 oz - 190 lb 191 lb 3.2 oz 190 lb 3.2 oz 192 lb 6.4 oz -   Neck Circ (inches) - 13 - - - - 14   Waist Measure Inches - 37.5 - - - - 35   BMI 29.18 kg/m2 - 28.89 kg/m2 29.07 kg/m2 28.92 kg/m2 29.25 kg/m2 -         Have you received any other medical care this week? yes  If yes, where and for what? [facility] women's health for low bp, fatigue and lightheadedness. Have you had any change in your medications since your last visit? no  If yes what? Did you have any problems adhering to the program last week? yes  If yes, please explain: had a small meal on Thursday. Eating Habits Over Last Week:  Did you take in 64 oz of non-caloric fluids?  yes     Did you consume your 4 meal replacements each day? no     Mood: fatigued    Physical Activity Over the Past Week:    Aerobic exercise: 0 min  Resistance exercise: 0 workouts / week

## 2019-08-09 ENCOUNTER — CLINICAL SUPPORT (OUTPATIENT)
Dept: BARIATRICS/WEIGHT MGMT | Age: 64
End: 2019-08-09

## 2019-08-09 VITALS
HEIGHT: 68 IN | SYSTOLIC BLOOD PRESSURE: 113 MMHG | HEART RATE: 68 BPM | DIASTOLIC BLOOD PRESSURE: 55 MMHG | BODY MASS INDEX: 28.76 KG/M2 | WEIGHT: 189.8 LBS

## 2019-08-09 DIAGNOSIS — E66.01 SEVERE OBESITY (HCC): ICD-10-CM

## 2019-08-09 DIAGNOSIS — E16.1 HYPERINSULINEMIA: ICD-10-CM

## 2019-08-09 DIAGNOSIS — E88.81 INSULIN RESISTANCE: ICD-10-CM

## 2019-08-09 DIAGNOSIS — E78.2 MIXED HYPERLIPIDEMIA: Primary | ICD-10-CM

## 2019-08-09 NOTE — PROGRESS NOTES
Progress Note: Weekly Education Class in the [facility] Weight Loss Program   Is there anything that you or the patient needs to let Dr Yfn Najera know about? no  Over the past week, have you experienced any side-effects? Yes, fatigue lightheadedness    Twila Davis is a 59 y.o. female who is enrolled in [facility]    Visit Vitals  /55 (BP 1 Location: Left arm, BP Patient Position: Sitting)   Pulse 68   Ht 5' 8\" (1.727 m)   Wt 189 lb 12.8 oz (86.1 kg)   BMI 28.86 kg/m²     Weight Metrics 8/9/2019 8/2/2019 7/22/2019 7/22/2019 7/19/2019 6/28/2019 6/21/2019   Weight 189 lb 12.8 oz 191 lb 14.4 oz - 190 lb 191 lb 3.2 oz 190 lb 3.2 oz 192 lb 6.4 oz   Neck Circ (inches) - - 13 - - - -   Waist Measure Inches 34.5 - 37.5 - - - -   BMI 28.86 kg/m2 29.18 kg/m2 - 28.89 kg/m2 29.07 kg/m2 28.92 kg/m2 29.25 kg/m2         Have you received any other medical care this week? yes  If yes, where and for what? [address], BP was low for about a week, lightheaded, fatigue, no energy    Have you had any change in your medications since your last visit? no  If yes what? Did you have any problems adhering to the program last week? yes  If yes, please explain: Get hungry for food in the evenings, i'm fine in the AM and afternoons      Eating Habits Over Last Week:  Did you take in 64 oz of non-caloric fluids? yes     Did you consume your 4 meal replacements each day?  yes       Physical Activity Over the Past Week:    Aerobic exercise: 0 min  Resistance exercise: 0 workouts / week

## 2019-08-15 LAB
ALBUMIN SERPL-MCNC: 4.1 G/DL (ref 3.6–4.8)
ALBUMIN/GLOB SERPL: 1.7 {RATIO} (ref 1.2–2.2)
ALP SERPL-CCNC: 75 IU/L (ref 39–117)
ALT SERPL-CCNC: 16 IU/L (ref 0–32)
AST SERPL-CCNC: 17 IU/L (ref 0–40)
BILIRUB SERPL-MCNC: 0.4 MG/DL (ref 0–1.2)
BUN SERPL-MCNC: 18 MG/DL (ref 8–27)
BUN/CREAT SERPL: 18 (ref 12–28)
CALCIUM SERPL-MCNC: 9.7 MG/DL (ref 8.7–10.3)
CHLORIDE SERPL-SCNC: 104 MMOL/L (ref 96–106)
CO2 SERPL-SCNC: 23 MMOL/L (ref 20–29)
CREAT SERPL-MCNC: 0.98 MG/DL (ref 0.57–1)
GLOBULIN SER CALC-MCNC: 2.4 G/DL (ref 1.5–4.5)
GLUCOSE SERPL-MCNC: 78 MG/DL (ref 65–99)
MAGNESIUM SERPL-MCNC: 2 MG/DL (ref 1.6–2.3)
POTASSIUM SERPL-SCNC: 4.1 MMOL/L (ref 3.5–5.2)
PROT SERPL-MCNC: 6.5 G/DL (ref 6–8.5)
SODIUM SERPL-SCNC: 140 MMOL/L (ref 134–144)
URATE SERPL-MCNC: 3.4 MG/DL (ref 2.5–7.1)

## 2019-08-15 NOTE — PROGRESS NOTES
Nurse note from patient's weekly VLCD / LCD / Maintenance class was reviewed. Pertinent medical concerns were:   Needs more water     BP Readings from Last 3 Encounters:   08/09/19 113/55   08/02/19 130/73   07/22/19 101/68       Weight Metrics 8/9/2019 8/2/2019 7/22/2019 7/22/2019 7/19/2019 6/28/2019 6/21/2019   Weight 189 lb 12.8 oz 191 lb 14.4 oz - 190 lb 191 lb 3.2 oz 190 lb 3.2 oz 192 lb 6.4 oz   Neck Circ (inches) - - 13 - - - -   Waist Measure Inches 34.5 - 37.5 - - - -   BMI 28.86 kg/m2 29.18 kg/m2 - 28.89 kg/m2 29.07 kg/m2 28.92 kg/m2 29.25 kg/m2       Current Outpatient Medications   Medication Sig Dispense Refill    OTHER,NON-FORMULARY, Pancreatic Enzymes 500mg 1 capsule with meals      thyroid, Pork, (ARMOUR THYROID) 60 mg tablet Take 60 mg by mouth daily.  MULTIVITAMIN (MULTIPLE VITAMINS PO) Take  by mouth.  occasional

## 2019-08-16 ENCOUNTER — CLINICAL SUPPORT (OUTPATIENT)
Dept: BARIATRICS/WEIGHT MGMT | Age: 64
End: 2019-08-16

## 2019-08-16 VITALS
SYSTOLIC BLOOD PRESSURE: 123 MMHG | DIASTOLIC BLOOD PRESSURE: 73 MMHG | WEIGHT: 187.4 LBS | HEIGHT: 68 IN | BODY MASS INDEX: 28.4 KG/M2 | HEART RATE: 83 BPM

## 2019-08-16 DIAGNOSIS — E78.2 MIXED HYPERLIPIDEMIA: Primary | ICD-10-CM

## 2019-08-16 DIAGNOSIS — E66.01 SEVERE OBESITY (HCC): ICD-10-CM

## 2019-08-16 DIAGNOSIS — E16.1 HYPERINSULINEMIA: ICD-10-CM

## 2019-08-19 ENCOUNTER — OFFICE VISIT (OUTPATIENT)
Dept: FAMILY MEDICINE CLINIC | Age: 64
End: 2019-08-19

## 2019-08-19 VITALS
WEIGHT: 187.1 LBS | OXYGEN SATURATION: 95 % | DIASTOLIC BLOOD PRESSURE: 76 MMHG | SYSTOLIC BLOOD PRESSURE: 114 MMHG | RESPIRATION RATE: 18 BRPM | HEIGHT: 68 IN | HEART RATE: 74 BPM | BODY MASS INDEX: 28.36 KG/M2 | TEMPERATURE: 98 F

## 2019-08-19 DIAGNOSIS — G47.33 OSA (OBSTRUCTIVE SLEEP APNEA): ICD-10-CM

## 2019-08-19 DIAGNOSIS — E16.1 HYPERINSULINEMIA: ICD-10-CM

## 2019-08-19 DIAGNOSIS — R63.4 RAPID WEIGHT LOSS: ICD-10-CM

## 2019-08-19 DIAGNOSIS — E66.9 OBESITY (BMI 35.0-39.9 WITHOUT COMORBIDITY): ICD-10-CM

## 2019-08-19 DIAGNOSIS — E88.81 INSULIN RESISTANCE: ICD-10-CM

## 2019-08-19 DIAGNOSIS — E03.9 ACQUIRED HYPOTHYROIDISM: ICD-10-CM

## 2019-08-19 DIAGNOSIS — E78.2 MIXED HYPERLIPIDEMIA: Primary | ICD-10-CM

## 2019-08-19 NOTE — PROGRESS NOTES
1. Have you been to the ER, urgent care clinic since your last visit? Hospitalized since your last visit? No    2. Have you seen or consulted any other health care providers outside of the [address] [place] [address] since your last visit? Include any pap smears or colon screening.  PCP    Chief Complaint   Patient presents with    Weight Management     MSWL     Body Weight: 187.1  Body Fat%: 37.6  Muscle Mass Weight: 28.3  Body Water Weight: 17.3  Basal Metabolic Rate: 6943  BMI: 28.45

## 2019-08-19 NOTE — PROGRESS NOTES
[facility] Progress Note:   F/up Physician Visit    CC: Weight Management      Maggie Baldwin is a 59 y.o. female who is here for her  f/up physician visit for the VLCD / LCD Program.    Has been off routine a little. No weight loss this week  She is thinking of transitioning to maintenance      Weight Metrics 8/19/2019 8/19/2019 8/16/2019 8/9/2019 8/2/2019 7/22/2019 7/22/2019   Weight - 187 lb 1.6 oz 187 lb 6.4 oz 189 lb 12.8 oz 191 lb 14.4 oz - 190 lb   Neck Circ (inches) 13.5 - - - - 13 -   Waist Measure Inches 33 - 35 34.5 - 37.5 -   BMI - 28.45 kg/m2 28.49 kg/m2 28.86 kg/m2 29.18 kg/m2 - 28.89 kg/m2     242  187  -55 lbs    Outpatient Medications Marked as Taking for the 8/19/19 encounter (Office Visit) with Alex Arrington MD   Medication Sig Dispense Refill    OTHER,NON-FORMULARY, Pancreatic Enzymes 500mg 1 capsule with meals      thyroid, Pork, (ARMOUR THYROID) 60 mg tablet Take 60 mg by mouth daily.  MULTIVITAMIN (MULTIPLE VITAMINS PO) Take  by mouth. occasional           Participation   Did you attend clinic and class last week? yes    Review of Systems  Since your last visit, have you experienced any complications? no  If yes, please list:       Are you taking an appetite suppressant? no  If so, is there any Chest Pain, Palpitations or Dizziness? BP Readings from Last 3 Encounters:   08/19/19 114/76   08/16/19 123/73   08/09/19 113/55       SLEEP: ___    Have you received any other medical care this week? no  If yes, where and for what? Have you discontinued or changed any medicine or dose of your medicine since your last visit with Dr Rosalva Vicente? no  If yes, where and for what? Diet  How many ounces of calorie-free liquids did you consume each day? 64 oz    How many meal replacements did you take each day?  4    Did you have any problems adhering to the program?  no If yes, please explain:      Exercise  Aerobic exercise: 0 min  Resistance exercise: 0 workouts / week  Any discomfort?  no     If yes, where? Objective  Visit Vitals  /76   Pulse 74   Temp 98 °F (36.7 °C) (Oral)   Resp 18   Ht 5' 8\" (1.727 m)   Wt 187 lb 1.6 oz (84.9 kg)   SpO2 95%   BMI 28.45 kg/m²     No LMP recorded. Patient has had a hysterectomy. Physical Exam  Appearance: well,   Mental:A&O x 3, NAD  H:NC/AT,  EENT:   EOMI, PERRL, No scleral icterus  Neck: no bruit or JVD  Lung: clear, No W/R  ABD: soft, active, nontender  Ext:  no Edema  Neuro: nonfocal  Assessment / Plan    Encounter Diagnoses   Name Primary?  Mixed hyperlipidemia Yes    Obesity (BMI 35.0-39.9 without comorbidity)     BRIT (obstructive sleep apnea)     Insulin resistance     Acquired hypothyroidism     Rapid weight loss     Hyperinsulinemia      Diagnoses and all orders for this visit:    1. Mixed hyperlipidemia  Recheck in 1 month after getting back on regular diet  2. Obesity (BMI 35.0-39.9 without comorbidity)  Start transition wiyth one dinner meal each day and the 2 replacements for B and L    3. BRIT (obstructive sleep apnea)  Need more discussion of sleep at th enext visit    4. Insulin resistance  Recheck after eating regular visit  5. Acquired hypothyroidism  Normal tsh on recent labs  6. Rapid weight loss  Electrolytes have neen normal  7. Hyperinsulinemia      1. Weight management improved   Progress was reviewed with patient    2. Labs    Latest results reviewed with patient   Lab slip given to pt for f/up  labs      Over 20 minutes of the 30 minutes face to face time with Pato Dodge consisted of counseling & coordinating and/or discussing treatment plans in reference to her obesity The primary encounter diagnosis was Mixed hyperlipidemia. Diagnoses of Obesity (BMI 35.0-39.9 without comorbidity), BRIT (obstructive sleep apnea), Insulin resistance, Acquired hypothyroidism, Rapid weight loss, and Hyperinsulinemia were also pertinent to this visit.

## 2019-08-25 NOTE — PROGRESS NOTES
Nurse note from patient's weekly VLCD / LCD / Maintenance class was reviewed. Pertinent medical concerns were:   Likely needs more water- discus at next visit w me     BP Readings from Last 3 Encounters:   08/19/19 114/76   08/16/19 123/73   08/09/19 113/55       Weight Metrics 8/19/2019 8/19/2019 8/16/2019 8/9/2019 8/2/2019 7/22/2019 7/22/2019   Weight - 187 lb 1.6 oz 187 lb 6.4 oz 189 lb 12.8 oz 191 lb 14.4 oz - 190 lb   Neck Circ (inches) 13.5 - - - - 13 -   Waist Measure Inches 33 - 35 34.5 - 37.5 -   BMI - 28.45 kg/m2 28.49 kg/m2 28.86 kg/m2 29.18 kg/m2 - 28.89 kg/m2       Current Outpatient Medications   Medication Sig Dispense Refill    OTHER,NON-FORMULARY, Pancreatic Enzymes 500mg 1 capsule with meals      thyroid, Pork, (ARMOUR THYROID) 60 mg tablet Take 60 mg by mouth daily.  MULTIVITAMIN (MULTIPLE VITAMINS PO) Take  by mouth.  occasional

## 2019-08-30 NOTE — PROGRESS NOTES
Nurse note from patient's weekly VLCD / LCD / Maintenance class was reviewed. Pertinent medical concerns were:   Discussed, doing ok     BP Readings from Last 3 Encounters:   08/19/19 114/76   08/16/19 123/73   08/09/19 113/55       Weight Metrics 8/19/2019 8/19/2019 8/16/2019 8/9/2019 8/2/2019 7/22/2019 7/22/2019   Weight - 187 lb 1.6 oz 187 lb 6.4 oz 189 lb 12.8 oz 191 lb 14.4 oz - 190 lb   Neck Circ (inches) 13.5 - - - - 13 -   Waist Measure Inches 33 - 35 34.5 - 37.5 -   BMI - 28.45 kg/m2 28.49 kg/m2 28.86 kg/m2 29.18 kg/m2 - 28.89 kg/m2       Current Outpatient Medications   Medication Sig Dispense Refill    OTHER,NON-FORMULARY, Pancreatic Enzymes 500mg 1 capsule with meals      thyroid, Pork, (ARMOUR THYROID) 60 mg tablet Take 60 mg by mouth daily.  MULTIVITAMIN (MULTIPLE VITAMINS PO) Take  by mouth.  occasional

## 2019-10-24 ENCOUNTER — OFFICE VISIT (OUTPATIENT)
Dept: CARDIOLOGY CLINIC | Age: 64
End: 2019-10-24

## 2019-10-24 VITALS
BODY MASS INDEX: 30.46 KG/M2 | DIASTOLIC BLOOD PRESSURE: 88 MMHG | SYSTOLIC BLOOD PRESSURE: 124 MMHG | HEART RATE: 74 BPM | HEIGHT: 68 IN | OXYGEN SATURATION: 99 % | WEIGHT: 201 LBS | RESPIRATION RATE: 16 BRPM

## 2019-10-24 DIAGNOSIS — R06.09 DOE (DYSPNEA ON EXERTION): ICD-10-CM

## 2019-10-24 DIAGNOSIS — I36.1 NONRHEUMATIC TRICUSPID VALVE REGURGITATION: ICD-10-CM

## 2019-10-24 DIAGNOSIS — E66.9 OBESITY, CLASS I, BMI 30-34.9: ICD-10-CM

## 2019-10-24 DIAGNOSIS — R06.02 SOB (SHORTNESS OF BREATH): Primary | ICD-10-CM

## 2019-10-24 NOTE — PROGRESS NOTES
[facility] [place]: Yusuf Shepard  [phone]  Requesting/referring provider: Dr. Alas Median  Reason for Consult: PAHTN    HPI: Katie Nunez, a 62y.o. year-old who presents for evaluation of valvular heart disease, prior imaging told to follow-up in three years. She has done the medical weight loss, lost from 242 down to 180 and now going back up to 201 today. Still doing low carb but not the liquid diet. Trying to get to her goal of 160. Had been seen by cardiology in the past and told to follow-up for echo in 3 years due to elevated pa pressures and TR. She feels pretty good. Out of breath climbing one flight of steps here today. Likely a combo of deconditioning, weight regain, etc, but will go ahead and check on her pap and tr. Assessment/Plan:  1. IGT preDM- gone with carb cutting and weight loss  2. Body mass index is 30.56 kg/m². working on Box[employer]  3. Sedentary lifestyle increase exercise  4. BL PAHTN recheck echo. Echo 2016 EF 55% mild TR mild pAHTN  Stress Echo exe 8:00 no ischemia  Cath 2010 wnl no irreg  Fhx brother with arrest at 62, HTN on ESRD  Soc on tob no etoh, rare caffeine  She  has a past medical history of Gluten intolerance, History of IBS, Hypertension, Hypothyroidism, Protein deficiency ([address] and Valvular heart disease. Cardiovascular ROS: positive for - dyspnea on exertion  Respiratory ROS: no cough, shortness of breath, or wheezing  Neurological ROS: no TIA or stroke symptoms  All other systems negative except as above. PE  Vitals:    10/24/19 0839   BP: 124/88   Pulse: 74   Resp: 16   SpO2: 99%   Weight: 201 lb (91.2 kg)   Height: 5' 8\" (1.727 m)    Body mass index is 30.56 kg/m².    General appearance - alert, well appearing, and in no distress  Mental status - affect appropriate to mood  Eyes - sclera anicteric, moist mucous membranes  Neck - supple, no significant adenopathy  Lymphatics - no  lymphadenopathy  Chest - clear to auscultation, no wheezes, rales or rhonchi  Heart - normal rate, regular rhythm, normal S1, S2, no murmurs, rubs, clicks or gallops  Abdomen - soft, nontender, nondistended, no masses or organomegaly  Back exam - full range of motion, no tenderness  Neurological - cranial nerves II through XII grossly intact, no focal deficit  Musculoskeletal - no muscular tenderness noted, normal strength  Extremities - peripheral pulses normal, no pedal edema  Skin - normal coloration  no rashes    Recent Labs:  No results found for: CHOL, CHOLX, CHLST, CHOLV, 990199, HDL, HDLP, LDL, LDLC, DLDLP, TGLX, TRIGL, TRIGP, CHHD, CHHDX  Lab Results   Component Value Date/Time    Creatinine 0.98 08/14/2019 09:30 AM     Lab Results   Component Value Date/Time    BUN 18 08/14/2019 09:30 AM     Lab Results   Component Value Date/Time    Potassium 4.1 08/14/2019 09:30 AM     No results found for: HBA1C, HGBE8, AMO0DBZL  Lab Results   Component Value Date/Time    HGB 13.7 07/27/2012 09:45 AM     Lab Results   Component Value Date/Time    PLATELET 251 43/64/3644 09:45 AM       Reviewed:  Past Medical History:   Diagnosis Date    Gluten intolerance     History of IBS     Hypertension     Hypothyroidism     Protein deficiency ([facility] [address]     Valvular heart disease      Social History     Tobacco Use   Smoking Status Never Smoker   Smokeless Tobacco Never Used     Social History     Substance and Sexual Activity   Alcohol Use Yes    Alcohol/week: 1.0 standard drinks    Types: 1 Cans of beer per week    Comment: occassional 1-2 per year     Allergies   Allergen Reactions    Egg Unknown (comments)     Per allergy test.    Grass Pollen Runny Nose    Morphine Itching    Wheat Unknown (comments)     Bloating/abdominal discomfort. Current Outpatient Medications   Medication Sig    HYDROXYZINE HCL PO Take 25 mg by mouth daily.  OTHER,NON-FORMULARY, Pancreatic Enzymes 500mg 1 capsule with meals    thyroid, Pork, (ARMOUR THYROID) 60 mg tablet Take 60 mg by mouth daily.  MULTIVITAMIN (MULTIPLE VITAMINS PO) Take  by mouth. occasional     No current facility-administered medications for this visit.         Monica Yip MD  [facility] heart and Vascular [place]  [facility] [facility] [address]  [address]

## 2019-10-24 NOTE — PROGRESS NOTES
Chief Complaint   Patient presents with    Valvular Heart Disease    New Patient     Last seen 8/17/16     1. Have you been to the ER, urgent care clinic since your last visit? Hospitalized since your last visit? No    2. Have you seen or consulted any other health care providers outside of the [address] [place] [address] since your last visit? Include any pap smears or colon screening. No    3) Do you have an Advance Directive on file?  no

## 2019-10-24 NOTE — PATIENT INSTRUCTIONS
Try My Fitness Pal or LoseIt  Your goal is 1800 calories a day. If not progressing then can cut down to 1600 calories a day. Walk 30 minutes each day. Try to get your carbs down to 50% of your total calories. Split the rest between protein and healthy fats.

## 2019-11-04 ENCOUNTER — TELEPHONE (OUTPATIENT)
Dept: CARDIOLOGY CLINIC | Age: 64
End: 2019-11-04

## 2019-11-04 NOTE — TELEPHONE ENCOUNTER
----- Message from Shannon Marroquin MD sent at 11/4/2019  9:11 AM EST -----  Echo: Looks fine      My chart message sent to patient.

## 2020-02-27 NOTE — PROGRESS NOTES
Progress Note: Weekly Education Class in the [facility] Weight Loss Program   Is there anything that you or the patient needs to let Dr Monty Mackay know about? no  Over the past week, have you experienced any side-effects? Yes weakness, fatigue, lightheadedness    Flakita Onofre is a 59 y.o. female who is enrolled in [facility] Loss Program    Visit Vitals  /73 (BP 1 Location: Right arm, BP Patient Position: Sitting)   Pulse 83   Ht 5' 8\" (1.727 m)   Wt 187 lb 6.4 oz (85 kg)   BMI 28.49 kg/m²     Weight Metrics 8/16/2019 8/9/2019 8/2/2019 7/22/2019 7/22/2019 7/19/2019 6/28/2019   Weight 187 lb 6.4 oz 189 lb 12.8 oz 191 lb 14.4 oz - 190 lb 191 lb 3.2 oz 190 lb 3.2 oz   Neck Circ (inches) - - - 13 - - -   Waist Measure Inches 35 34.5 - 37.5 - - -   BMI 28.49 kg/m2 28.86 kg/m2 29.18 kg/m2 - 28.89 kg/m2 29.07 kg/m2 28.92 kg/m2         Have you received any other medical care this week? yes  If yes, where and for what? [facility] - follow up from 8/1 more blood taken, low blood pressure (said I was a little dehydrated)    Have you had any change in your medications since your last visit? no  If yes what? Did you have any problems adhering to the program last week? no  If yes, please explain:       Eating Habits Over Last Week:  Did you take in 64 oz of non-caloric fluids?  yes     Did you consume your 4 meal replacements each day? no       Physical Activity Over the Past Week:    Aerobic exercise: 0 min  Resistance exercise: 0 workouts / week Xolair Pregnancy And Lactation Text: This medication is Pregnancy Category B and is considered safe during pregnancy. This medication is excreted in breast milk.

## 2021-11-12 ENCOUNTER — TRANSCRIBE ORDER (OUTPATIENT)
Dept: EMERGENCY DEPT | Age: 66
End: 2021-11-12

## 2021-11-12 ENCOUNTER — HOSPITAL ENCOUNTER (OUTPATIENT)
Dept: LAB | Age: 66
Discharge: HOME | End: 2021-11-12
Payer: COMMERCIAL

## 2021-11-12 DIAGNOSIS — Z01.812 PRE-OPERATIVE LABORATORY EXAMINATION: ICD-10-CM

## 2021-11-12 DIAGNOSIS — Z01.812 PRE-OPERATIVE LABORATORY EXAMINATION: Primary | ICD-10-CM

## 2021-11-12 PROCEDURE — U0005 INFEC AGEN DETEC AMPLI PROBE: HCPCS

## 2021-11-12 NOTE — PERIOP NOTES
Left generalized message regarding COVID requirements, a COVID test needs to be completed in order to proceed with procedures at [facility]. Left message regarding curbside COVID swabbing at [facility] Friday, November 12th or Monday, November 15th from [phone]. Call [facility] Endoscopy at [phone] Monday thru Friday with questions or concerns.

## 2021-11-13 LAB
SARS-COV-2, XPLCVT: NOT DETECTED
SOURCE, COVRS: NORMAL

## 2021-11-15 RX ORDER — AMLODIPINE BESYLATE 5 MG/1
5 TABLET ORAL DAILY
COMMUNITY

## 2021-11-15 NOTE — PERIOP NOTES
[address]  Endoscopy Preprocedure Instructions      1. On the day of your surgery, please report to registration located on the 2nd floor of the  [facility]. yes    2. You must have a responsible adult to drive you to the hospital, stay at the hospital during your procedure and drive you home. If they leave your procedure will not be started (no exceptions). yes    3. Do not have anything to eat or drink (including water, gum, mints, coffee, and juice) after midnight. This does not apply to the medications you were instructed to take by your physician. yesIf you are currently taking Plavix, Coumadin, Aspirin, or other blood-thinning agents, contact your physician for special instructions. no,    4. If you are having a procedure that requires bowel prep: We recommend that you have only clear liquids the day before your procedure and begin your bowel prep by 5:00 pm.  You may continue to drink clear liquids until midnight. If for any reason you are not able to complete your prep please notify your physician immediately. yes    5. Have a list of all current medications, including vitamins, herbal supplements and any other over the counter medications. yes    6. If you wear glasses, contacts, dentures and/or hearing aids, they may be removed prior to procedure, please bring a case to store them in. yes    7. You should understand that if you do not follow these instructions your procedure may be cancelled. If your physical condition changes (I.e. fever, cold or flu) please contact your doctor as soon as possible. 8. It is important that you be on time.   If for any reason you are unable to keep your appointment please call )- the day of or your physicians office prior to your scheduled procedure

## 2021-11-17 ENCOUNTER — ANESTHESIA (OUTPATIENT)
Dept: ENDOSCOPY | Age: 66
End: 2021-11-17
Payer: COMMERCIAL

## 2021-11-17 ENCOUNTER — HOSPITAL ENCOUNTER (OUTPATIENT)
Age: 66
Setting detail: OUTPATIENT SURGERY
Discharge: HOME | End: 2021-11-17
Attending: INTERNAL MEDICINE | Admitting: INTERNAL MEDICINE
Payer: COMMERCIAL

## 2021-11-17 ENCOUNTER — ANESTHESIA EVENT (OUTPATIENT)
Dept: ENDOSCOPY | Age: 66
End: 2021-11-17
Payer: COMMERCIAL

## 2021-11-17 VITALS
HEART RATE: 74 BPM | WEIGHT: 234.13 LBS | BODY MASS INDEX: 35.48 KG/M2 | DIASTOLIC BLOOD PRESSURE: 84 MMHG | TEMPERATURE: 97.6 F | RESPIRATION RATE: 14 BRPM | HEIGHT: 68 IN | SYSTOLIC BLOOD PRESSURE: 132 MMHG | OXYGEN SATURATION: 99 %

## 2021-11-17 PROCEDURE — 74011000250 HC RX REV CODE- 250: Performed by: NURSE ANESTHETIST, CERTIFIED REGISTERED

## 2021-11-17 PROCEDURE — 76040000019: Performed by: INTERNAL MEDICINE

## 2021-11-17 PROCEDURE — 2709999900 HC NON-CHARGEABLE SUPPLY: Performed by: INTERNAL MEDICINE

## 2021-11-17 PROCEDURE — 74011000258 HC RX REV CODE- 258: Performed by: NURSE ANESTHETIST, CERTIFIED REGISTERED

## 2021-11-17 PROCEDURE — 76060000031 HC ANESTHESIA FIRST 0.5 HR: Performed by: INTERNAL MEDICINE

## 2021-11-17 PROCEDURE — 74011250636 HC RX REV CODE- 250/636: Performed by: NURSE ANESTHETIST, CERTIFIED REGISTERED

## 2021-11-17 RX ORDER — PROPOFOL 10 MG/ML
INJECTION, EMULSION INTRAVENOUS AS NEEDED
Status: DISCONTINUED | OUTPATIENT
Start: 2021-11-17 | End: 2021-11-17 | Stop reason: HOSPADM

## 2021-11-17 RX ORDER — SODIUM CHLORIDE 9 MG/ML
INJECTION, SOLUTION INTRAVENOUS
Status: DISCONTINUED | OUTPATIENT
Start: 2021-11-17 | End: 2021-11-17 | Stop reason: HOSPADM

## 2021-11-17 RX ORDER — FLUMAZENIL 0.1 MG/ML
0.2 INJECTION INTRAVENOUS
Status: DISCONTINUED | OUTPATIENT
Start: 2021-11-17 | End: 2021-11-17 | Stop reason: HOSPADM

## 2021-11-17 RX ORDER — LIDOCAINE HYDROCHLORIDE 20 MG/ML
INJECTION, SOLUTION EPIDURAL; INFILTRATION; INTRACAUDAL; PERINEURAL AS NEEDED
Status: DISCONTINUED | OUTPATIENT
Start: 2021-11-17 | End: 2021-11-17 | Stop reason: HOSPADM

## 2021-11-17 RX ORDER — NALOXONE HYDROCHLORIDE 0.4 MG/ML
0.4 INJECTION, SOLUTION INTRAMUSCULAR; INTRAVENOUS; SUBCUTANEOUS
Status: DISCONTINUED | OUTPATIENT
Start: 2021-11-17 | End: 2021-11-17 | Stop reason: HOSPADM

## 2021-11-17 RX ORDER — MIDAZOLAM HYDROCHLORIDE 1 MG/ML
.25-5 INJECTION, SOLUTION INTRAMUSCULAR; INTRAVENOUS
Status: DISCONTINUED | OUTPATIENT
Start: 2021-11-17 | End: 2021-11-17 | Stop reason: HOSPADM

## 2021-11-17 RX ORDER — PROPOFOL 10 MG/ML
INJECTION, EMULSION INTRAVENOUS
Status: DISCONTINUED | OUTPATIENT
Start: 2021-11-17 | End: 2021-11-17 | Stop reason: HOSPADM

## 2021-11-17 RX ORDER — AMOXICILLIN AND CLAVULANATE POTASSIUM 875; 125 MG/1; MG/1
1 TABLET, FILM COATED ORAL 2 TIMES DAILY
Qty: 28 TABLET | Refills: 0 | Status: SHIPPED | OUTPATIENT
Start: 2021-11-17 | End: 2021-12-01

## 2021-11-17 RX ORDER — ATROPINE SULFATE 0.1 MG/ML
0.5 INJECTION INTRAVENOUS
Status: DISCONTINUED | OUTPATIENT
Start: 2021-11-17 | End: 2021-11-17 | Stop reason: HOSPADM

## 2021-11-17 RX ORDER — METRONIDAZOLE 500 MG/1
250 TABLET ORAL 4 TIMES DAILY
Qty: 56 TABLET | Refills: 0 | Status: SHIPPED | OUTPATIENT
Start: 2021-11-17 | End: 2021-12-01

## 2021-11-17 RX ORDER — EPINEPHRINE 0.1 MG/ML
1 INJECTION INTRACARDIAC; INTRAVENOUS
Status: DISCONTINUED | OUTPATIENT
Start: 2021-11-17 | End: 2021-11-17 | Stop reason: HOSPADM

## 2021-11-17 RX ORDER — FENTANYL CITRATE 50 UG/ML
100 INJECTION, SOLUTION INTRAMUSCULAR; INTRAVENOUS
Status: DISCONTINUED | OUTPATIENT
Start: 2021-11-17 | End: 2021-11-17 | Stop reason: HOSPADM

## 2021-11-17 RX ORDER — SODIUM CHLORIDE 9 MG/ML
50 INJECTION, SOLUTION INTRAVENOUS CONTINUOUS
Status: DISCONTINUED | OUTPATIENT
Start: 2021-11-17 | End: 2021-11-17 | Stop reason: HOSPADM

## 2021-11-17 RX ORDER — DEXTROMETHORPHAN/PSEUDOEPHED 2.5-7.5/.8
1.2 DROPS ORAL
Status: DISCONTINUED | OUTPATIENT
Start: 2021-11-17 | End: 2021-11-17 | Stop reason: HOSPADM

## 2021-11-17 RX ADMIN — PROPOFOL 150 MCG/KG/MIN: 10 INJECTION, EMULSION INTRAVENOUS at 14:40

## 2021-11-17 RX ADMIN — PROPOFOL INJECTABLE EMULSION 50 MG: 10 INJECTION, EMULSION INTRAVENOUS at 14:37

## 2021-11-17 RX ADMIN — PROPOFOL INJECTABLE EMULSION 50 MG: 10 INJECTION, EMULSION INTRAVENOUS at 14:40

## 2021-11-17 RX ADMIN — LIDOCAINE HYDROCHLORIDE 40 MG: 20 INJECTION, SOLUTION INTRAVENOUS at 14:37

## 2021-11-17 RX ADMIN — SODIUM CHLORIDE: 9 INJECTION, SOLUTION INTRAVENOUS at 14:32

## 2021-11-17 NOTE — PROGRESS NOTES
Endoscopy discharge instructions have been reviewed and given to patient. The patient verbalized understanding and acceptance of instructions. Dr. Levon Chance discussed with patient procedure findings and next steps.

## 2021-11-17 NOTE — ANESTHESIA PREPROCEDURE EVALUATION
Relevant Problems   No relevant active problems       Anesthetic History   No history of anesthetic complications            Review of Systems / Medical History  Patient summary reviewed and pertinent labs reviewed    Pulmonary        Sleep apnea: CPAP           Neuro/Psych   Within defined limits           Cardiovascular    Hypertension                   GI/Hepatic/Renal     GERD           Endo/Other      Hypothyroidism  Obesity     Other Findings              Physical Exam    Airway  Mallampati: II  TM Distance: 4 - 6 cm  Neck ROM: normal range of motion   Mouth opening: Normal     Cardiovascular  Regular rate and rhythm,  S1 and S2 normal,  no murmur, click, rub, or gallop  Rhythm: regular  Rate: normal         Dental  No notable dental hx       Pulmonary  Breath sounds clear to auscultation               Abdominal  GI exam deferred       Other Findings            Anesthetic Plan    ASA: 2  Anesthesia type: MAC          Induction: Intravenous  Anesthetic plan and risks discussed with: Patient

## 2021-11-17 NOTE — PROGRESS NOTES
Noreen Bachelor  1955  [id number]    Situation:  Verbal report received from: Areli Armendariz RN  Procedure: Procedure(s):  COLONOSCOPY    Background:    Preoperative diagnosis: CHANGE IN BOWEL HABITS/LOWER ABDOMINAL PAIN  Postoperative diagnosis: Diveritculosis    :  Dr. Maren Strange  Assistant(s): Endoscopy RN-1: Devi Wan RN; Evelyn Lee RN    Specimens: * No specimens in log *  H. Pylori  no    Assessment:  Intra-procedure medications   Anesthesia gave intra-procedure sedation and medications, see anesthesia flow sheet yes    Intravenous fluids: NS@ KVO     Vital signs stable yes    Abdominal assessment: round and soft yes    Recommendation:  Discharge patient per MD order yes.   Family or Friend  spouse  Permission to share finding with family or friend yes

## 2021-11-17 NOTE — H&P
Patient Name: Toi Upton     2021     Gender: Female      (age): 1955 (73)           Referring Physician:      Cecilio Cruz  [address] [facility], [address]  [phone] (phone)  [phone] (fax)     Tammi Olivo. [address] [facility] [address], [address]  [phone] (phone)  [phone] (fax)            Chief Complaint:   abdominal pain         History of Present Illness: The patient complains of abdominal pain. Symptoms began 7 - 10 weeks ago. It is localized as left lower quadrant pain. It is detailed as moderate in nature. Pain quality is described as band-like. It also radiates to the periumbilical.  Discomfort typically lasts many hours. It usually starts continuously. Symptom triggers include nothing specific. Alleviating factors include nothing specific. Symptoms have been progressive since onset. Alarm features noted: none. The patient also reports alteration of bowel habit,. Symptoms have caused the patient to see primary care physician.    Pains marginally improved by bowel movement; PCP advised no uti           Past Medical History          Medical Conditions:     Asthma    Diverticulosis    hiatal hernia    High blood pressure    protein c deficiency    Sleep apnea    Thyroid problems         Surgical Procedures:     lt hand surgery    Gallbladder surgery    Hysterectomy    lymphoma removed, rt upper back         Dx Studies:     Abdominal U/S    Barium Swallow    CAT Scan    Colonoscopy    CT Scan,     Endoscopy    Gastric Emptying Scan         Medications:     betaine HCl 300 mg Take 1 tablet by mouth twice a day as directed    cholecalciferol (vitamin D3) 125 mcg (5,000 unit) Take 1 capsule by mouth twice a week as directed    magnesium gluconate 30 mg (550 mg) Take 2 tablet by mouth once a day as directed    Probiotic 15 billion cell Take 1 capsule by mouth once a week as directed         Allergies:     food allergies, ragweed, tree allergies    morphine         Immunizations:     COVID Vaccine, 06/20/2021; 07/20/2021    Influenza vaccination (refused)    Influenza vaccination (refused)    Influenza vaccination (refused)            Social History          Alcohol:   Beer. Consume Alcohol few1-2 times a year. Wine. Tobacco:     Never smoker         Drugs:     None         Exercise:     None         Caffeine:   1-2 times /week. Weekly. Family History   No history of Esophogeal Cancer, GI Cancers, IBD (Crohn's or UC), Liver disease  Father: Diagnosed with Family history of colon cancer, Family history of colon polyps; Review of Systems:       Cardiovascular: Denies chest pain, irregular heart beat, palpitations, peripheral edema, syncope, Sweats. Constitutional: Presents suffers from fatigue, weight gain. Denies fever, loss of appetite, weight loss. ENMT: Denies nose bleeds, sore throat, hearing loss. Endocrine: Denies excessive thirst, heat intolerance. Eyes: Denies loss of vision. Gastrointestinal: Presents suffers from abdominal pain, change in bowel habits, constipation, diarrhea, Bloating/gas, heartburn. Denies abdominal swelling, jaundice, nausea, rectal bleeding, stomach cramps, vomiting, dysphagia, rectal pain, Stool incontinence, hematemesis. Genitourinary: Denies dark urine, dysuria, frequent urination, hematuria, incontinence. Hematologic/Lymphatic: Denies easy bruising, prolonged bleeding. Integumentary: Denies itching, rashes, sun sensitivity. Musculoskeletal: Presents suffers from joint pain. Denies arthritis, back pain, gout, muscle weakness, stiffness. Neurological: Denies dizziness, fainting, frequent headaches, memory loss. Psychiatric: Denies anxiety, depression, difficulty sleeping, hallucinations, nervousness, panic attacks, paranoia. Respiratory: Denies cough, dyspnea, wheezing.          Vital Signs: See nursing notes    Comfortable    Lungs clear to percussion and auscultation    Cardiac regular rate and rhythm    Abdomen no distention, tenderness, mass lesions    Extremities no edema      Impressions:   Change in bowel habit  Lower abdominal pain, unspecified  Screening colonoscopy (Screening for colonic neoplasia)      Plan:     I've discussed colonoscopy possible biopsy, polypectomy, cautery, injection, alternatives, complications including but not limited to pain, cardiopulmonary event, bleeding, perforation requiring additional blood transfusion or operative repair; all questions answered.

## 2021-11-17 NOTE — DISCHARGE INSTRUCTIONS
Washington Rodriguez  [id number]  1955    DISCHARGE INSTRUCTIONS    Results:  Could be some residual diverticulitis    Discomfort:  Redness at IV site- apply warm compress to area; if redness or soreness persist- contact your physician. There may be a slight amount of blood passed from the rectum. Gaseous discomfort - walking, belching will help relieve any discomfort. You may not operate a vehicle for 12 hours. You may not engage in an occupation involving machinery or appliances for rest of today. You may not drink alcoholic beverages for at least 12 hours. Avoid making any critical decisions for at least 24 hours. DIET:   High fiber diet. Medications:               Twice daily two weeks, metronidazole plus amoxicillin-clavulanic acid; see Tiffanie Shea for radiologic testing if pains persist in spite completion recommended antibiotics              Resume usual medications today   ACTIVITY:  You may resume your normal daily activities it is recommended that you spend the remainder of the day resting -  avoid any strenuous activity. CALL M.D. ANY SIGN OF:   Increasing pain, nausea, vomiting  Abdominal distension (swelling)  New increased bleeding (oral or rectal)  Fever (chills)  Pain in chest area  Bloody discharge from nose or mouth  Shortness of breath     Follow-up Instructions:  Call Dr. Vi Albrecht if you have any questions or problems.   Telephone # [phone]-        DISCHARGE SUMMARY from Nurse    The following personal items collected during your admission are returned to you:   Dental Appliance: Dental Appliances: None  Vision:    Hearing Aid:    Jewelry:    Clothing:    Other Valuables:    Valuables sent to safe:

## 2021-11-17 NOTE — PERIOP NOTES
Received recovery report from Anesthesia team, see anesthesia note. ABD remains soft and non-tender post procedure. Pt has no complaints at this time and tolerated the procedure well. Endoscope was pre-cleaned at bedside immediately following procedure by Sai Devlin RN Post recovery report given to Kaye Valenzuela RN.

## 2021-11-17 NOTE — ANESTHESIA POSTPROCEDURE EVALUATION
Procedure(s):  COLONOSCOPY. MAC    Anesthesia Post Evaluation      Multimodal analgesia: multimodal analgesia not used between 6 hours prior to anesthesia start to PACU discharge  Patient location during evaluation: PACU  Patient participation: complete - patient participated  Level of consciousness: awake  Pain management: adequate  Airway patency: patent  Anesthetic complications: no  Cardiovascular status: acceptable, blood pressure returned to baseline and hemodynamically stable  Respiratory status: acceptable  Hydration status: acceptable  Post anesthesia nausea and vomiting:  controlled      INITIAL Post-op Vital signs:   Vitals Value Taken Time   /84 11/17/21 1528   Temp 36.4 °C (97.6 °F) 11/17/21 1503   Pulse 73 11/17/21 1529   Resp 15 11/17/21 1529   SpO2 99 % 11/17/21 1529   Vitals shown include unvalidated device data.

## 2021-11-17 NOTE — PROCEDURES
[address]  [phone]      2021    Colonoscopy Procedure Note  Penelope Troy  :  1955  [facility] Medical Record Number: [id number]    Indications:     Abdominal pain, LLQ, Abdominal pain, RLQ  PCP:  Samantha Viramontes MD  Anesthesia/Sedation: see nursing notes  Endoscopist:  Dr. Lasha Kelley  Assistants: None  Complications:  None  Estimate Blood Loss:  None    Permit:  The indications, risks, benefits and alternatives were reviewed with the patient or their decision maker who was provided an opportunity to ask questions and all questions were answered. The specific risks of colonoscopy with conscious sedation were reviewed, including but not limited to anesthetic complication, bleeding, adverse drug reaction, missed lesion, infection, IV site reactions, and intestinal perforation which would lead to the need for surgical repair. Alternatives to colonoscopy including radiographic imaging, observation without testing, or laboratory testing were reviewed including the limitations of those alternatives. After considering the options and having all their questions answered, the patient or their decision maker provided both verbal and written consent to proceed. Procedure in Detail:  After obtaining informed consent, positioning of the patient in the left lateral decubitus position, and conduction of a pre-procedure pause or \"time out\" the endoscope was introduced into the anus and advanced to the terminal ileum. The quality of the colonic preparation was adequate. A careful inspection was made as the colonoscope was withdrawn, findings and interventions are described below.     Appendiceal orifice photographed    Findings:       - Diverticulosis length of colon as well as in ileum; too numerous to count      - marked sigmoid deformity because of numerous diverticulosis; difficult to visualize area stricture to be sure as to absence inflammation    Specimens:    none    Implants: none    Complications:   None; patient tolerated the procedure well. Estimated blood loss: none    Impression:  colonic and ileal diverticulosis with possible persistence diverticulitis    Recommendations:      - two weeks metronidazole, amoxicillin, clavulanic acid   Because of age, routine follow up exam not recommended  Follow up with persistence pain in spite of antibiotics    Thank you for entrusting me with this patient's care. Please do not hesitate to contact me with any questions or if I can be of assistance with any of your other patients' GI needs.     Signed By: Nakul Salinas MD                        November 17, 2021

## 2021-11-17 NOTE — PROGRESS NOTES
Endoscopy discharge instructions have been reviewed with spouse as well. The spouse verbalized understanding and acceptance of instructions.

## 2022-03-19 PROBLEM — E66.9 OBESITY, CLASS I, BMI 30-34.9: Status: ACTIVE | Noted: 2019-10-24

## 2022-03-19 PROBLEM — E66.01 SEVERE OBESITY (HCC): Status: ACTIVE | Noted: 2018-11-26

## 2022-03-20 PROBLEM — R06.09 DOE (DYSPNEA ON EXERTION): Status: ACTIVE | Noted: 2019-10-24

## 2022-03-20 PROBLEM — I36.1 NONRHEUMATIC TRICUSPID VALVE REGURGITATION: Status: ACTIVE | Noted: 2019-10-24

## (undated) DEVICE — SOLIDIFIER MEDC 1200ML -- CONVERT TO 356117

## (undated) DEVICE — KIT COLON W/ 1.1OZ LUB AND 2 END

## (undated) DEVICE — SET ADMIN 16ML TBNG L100IN 2 Y INJ SITE IV PIGGY BK DISP

## (undated) DEVICE — SYR 5ML 1/5 GRAD LL NSAF LF --

## (undated) DEVICE — CUFF RMFG BP INF SZ 11 DISP -- LAWSON OEM ITEM 238915

## (undated) DEVICE — BAG BELONG PT PERS CLEAR HANDL

## (undated) DEVICE — BASIN EMSIS 16OZ GRAPHITE PLAS KID SHP MOLD GRAD FOR ORAL

## (undated) DEVICE — 1200 GUARD II KIT W/5MM TUBE W/O VAC TUBE: Brand: GUARDIAN

## (undated) DEVICE — SYR 3ML LL TIP 1/10ML GRAD --

## (undated) DEVICE — Device

## (undated) DEVICE — (D)SENSOR RMFG 02 PULS OXMTR -- DISC BY MFR USE ITEM 133445

## (undated) DEVICE — CATH IV AUTOGRD BC BLU 22GA 25 -- INSYTE

## (undated) DEVICE — NDL PRT INJ NSAF BLNT 18GX1.5 --

## (undated) DEVICE — NDL FLTR TIP 5 MIC 18GX1.5IN --

## (undated) DEVICE — ELECTRODE,RADIOTRANSLUCENT,FOAM,3PK: Brand: MEDLINE